# Patient Record
Sex: MALE | Race: WHITE | NOT HISPANIC OR LATINO | Employment: UNEMPLOYED | URBAN - NONMETROPOLITAN AREA
[De-identification: names, ages, dates, MRNs, and addresses within clinical notes are randomized per-mention and may not be internally consistent; named-entity substitution may affect disease eponyms.]

---

## 2019-09-20 ENCOUNTER — OFFICE VISIT (OUTPATIENT)
Dept: FAMILY MEDICINE CLINIC | Facility: CLINIC | Age: 24
End: 2019-09-20

## 2019-09-20 DIAGNOSIS — F90.2 ATTENTION DEFICIT HYPERACTIVITY DISORDER, COMBINED TYPE: Primary | ICD-10-CM

## 2019-09-20 PROCEDURE — 90791 PSYCH DIAGNOSTIC EVALUATION: CPT | Performed by: PSYCHOLOGIST

## 2019-10-04 NOTE — PROGRESS NOTES
10/4/2019    Ricki Andrade, a 24 y.o. male, was seen today for initial appointment lasting 45 minutes.  Patient is referred by his mother for counseling.     SUBJECTIVE:  He is experiencing th following symptoms: is easily bored, anhedonia, worry, amotivation, emotional eating, and sadness.    FAMILY HISTORY:  He lives in the home of his mother.  His parents  in 2010.  His mother plans to  a man form [place].  He will be moving with her in January 2020.      He has an unstable employment history.   He has no long term goals.  He is unemployed.    He enjoys music, movies and food.      MENTAL STATUS:  He was alert and oriented to time, place and person.      DIAGNOSIS:    ICD-10-CM ICD-9-CM   1. Attention deficit hyperactivity disorder, combined type F90.2 314.01       ASSESSMENT PLAN:  He will follow up as needed for counseling.                This document has been electronically signed by Abhinav Nieves, PhD on October 4, 2019 11:37 AM

## 2019-11-26 ENCOUNTER — OFFICE VISIT (OUTPATIENT)
Dept: FAMILY MEDICINE CLINIC | Facility: CLINIC | Age: 24
End: 2019-11-26

## 2019-11-26 DIAGNOSIS — F33.1 MAJOR DEPRESSIVE DISORDER, RECURRENT EPISODE, MODERATE (HCC): Primary | ICD-10-CM

## 2019-11-26 PROCEDURE — 90834 PSYTX W PT 45 MINUTES: CPT | Performed by: PSYCHOLOGIST

## 2019-11-26 NOTE — PROGRESS NOTES
PROGRESS NOTE  Data:  Ricki Andrade was seen for their regularly scheduled individual psychotherapy session.    (Scales based on 0 - 10 with 10 being the worst)  Depression: 6 Anxiety: 4   Distress: 2 Sleep: 4   Tasks Completed on Time: 2 Mood: 5   Number of Panic Attacks: 3 Appetite: 3       Mental Status Exam  Appearance:  clean and casually dressed, appropriate  Attitude toward clinician:  cooperative and agreeable   Speech:    Rate:  regular rate and rhythm   Volume:  normal  Motor:  no abnormal movements present  Mood:  sad  Affect:  mood congruent  Thought Processes:  linear, logical, and goal directed  Thought Content:  normal  Suicidal Thoughts:  absent  Homicidal Thoughts:  absent  Perceptual Disturbance: no perceptual disturbance  Attention and Concentration:  good  Insight and Judgement:  good  Memory:  memory appears to be intact    Patient's Support Network Includes:  He lives in the home with his mother.  Assessment/Plan   Clinical Maneuvering/Intervention:  Therapist & client discussed: (1) disappointment that he is not [facility] with his mother, (2) the symptoms of depression, (3) the ways he can manage depression, and (4) his anti-Episcopalian bent.      Diagnoses and all orders for this visit:    Major depressive disorder, recurrent episode, moderate (CMS/HCC)    I spent 45minutes in direct face to face contact with patient.  Greater than 50% of this time was spent counseling patient and discussing plan of care.  No Follow-up on file.

## 2020-01-24 ENCOUNTER — OFFICE VISIT (OUTPATIENT)
Dept: FAMILY MEDICINE CLINIC | Facility: CLINIC | Age: 25
End: 2020-01-24

## 2020-01-24 VITALS
HEART RATE: 120 BPM | OXYGEN SATURATION: 98 % | DIASTOLIC BLOOD PRESSURE: 82 MMHG | HEIGHT: 68 IN | SYSTOLIC BLOOD PRESSURE: 118 MMHG | BODY MASS INDEX: 31.07 KG/M2 | WEIGHT: 205 LBS

## 2020-01-24 DIAGNOSIS — F33.1 MAJOR DEPRESSIVE DISORDER, RECURRENT EPISODE, MODERATE (HCC): Primary | ICD-10-CM

## 2020-01-24 DIAGNOSIS — F32.9 MAJOR DEPRESSIVE DISORDER WITH CURRENT ACTIVE EPISODE, UNSPECIFIED DEPRESSION EPISODE SEVERITY, UNSPECIFIED WHETHER RECURRENT: Primary | ICD-10-CM

## 2020-01-24 DIAGNOSIS — Z76.89 ENCOUNTER TO ESTABLISH CARE: ICD-10-CM

## 2020-01-24 PROCEDURE — 90834 PSYTX W PT 45 MINUTES: CPT | Performed by: PSYCHOLOGIST

## 2020-01-24 PROCEDURE — 99203 OFFICE O/P NEW LOW 30 MIN: CPT | Performed by: STUDENT IN AN ORGANIZED HEALTH CARE EDUCATION/TRAINING PROGRAM

## 2020-01-24 RX ORDER — VENLAFAXINE HYDROCHLORIDE 75 MG/1
75 CAPSULE, EXTENDED RELEASE ORAL DAILY
Qty: 30 CAPSULE | Refills: 0 | Status: SHIPPED | OUTPATIENT
Start: 2020-01-24 | End: 2020-02-21 | Stop reason: SDUPTHER

## 2020-01-24 NOTE — PROGRESS NOTES
"    Family Medicine Residency  Riley Lora MD    Subjective:     Ricki Andrade is a 24 y.o. male who presents for management of     LACK OF MOTIVATION  -\"I have zero motivation\"  -patient constantly worries that he will be nothing, \"will never amount to anything\"  -denies manic episodes   -Patient reports that he will buy frivolous things on [employer], watch pornography and drink alcohol    WEIGHT ISSUES   - weight gain  -patient reports that he over eats   -Patient reports that he sometimes does nothing but sit on the couch and watch TV and eat bags of chips    Establish care with new physician  Patient is here to establish care with new physician, endorses that he has not had a PCP ever  -Patient reports that he has no medical history   -Patient has no other medical concerns except the ones previously mentioned    Patient denied chest pain, chest pressure, SOA, fever, chills, n/v or diarrhea at this time.     The following portions of the patient's history were reviewed and updated as appropriate: allergies, current medications, past family history, past medical history, past social history, past surgical history and problem list.    Past Medical Hx:  History reviewed. No pertinent past medical history.    Past Surgical Hx:  History reviewed. No pertinent surgical history.    Current Meds:    Current Outpatient Medications:   •  venlafaxine XR (EFFEXOR XR) 75 MG 24 hr capsule, Take 1 capsule by mouth Daily for 30 days., Disp: 30 capsule, Rfl: 0    Allergies:  Allergies   Allergen Reactions   • Cephalexin Unknown - High Severity       Family Hx:  History reviewed. No pertinent family history.     Social History:  Social History     Socioeconomic History   • Marital status: Single     Spouse name: Not on file   • Number of children: Not on file   • Years of education: Not on file   • Highest education level: Not on file   Tobacco Use   • Smoking status: Never Smoker   • Smokeless tobacco: Never Used   " "  Substance and Sexual Activity   • Alcohol use: Yes     Alcohol/week: 4.0 standard drinks     Types: 2 Cans of beer, 1 Shots of liquor, 1 Glasses of wine per week   • Drug use: Never       Review of Systems  Review of Systems   Constitutional: Negative for activity change, appetite change, chills, fatigue and fever.   HENT: Negative for congestion, hearing loss, sinus pressure, sinus pain, sneezing, sore throat and trouble swallowing.    Eyes: Negative for pain, discharge and itching.   Respiratory: Negative for apnea, cough, choking, chest tightness, shortness of breath, wheezing and stridor.    Cardiovascular: Negative for chest pain, palpitations and leg swelling.   Gastrointestinal: Negative for abdominal distention, abdominal pain, anal bleeding, constipation, diarrhea, nausea and vomiting.   Genitourinary: Negative for difficulty urinating, dysuria, enuresis and flank pain.   Musculoskeletal: Negative for arthralgias, back pain and gait problem.   Skin: Negative for color change.   Neurological: Negative for dizziness, seizures, syncope, facial asymmetry, light-headedness, numbness and headaches.   Psychiatric/Behavioral: Positive for dysphoric mood. Negative for agitation, behavioral problems, self-injury and suicidal ideas. The patient is not nervous/anxious.        Objective:     /82   Pulse 120   Ht 172.7 cm (68\")   Wt 93 kg (205 lb)   SpO2 98%   BMI 31.17 kg/m²   Physical Exam   Constitutional: He is oriented to person, place, and time. He appears well-developed and well-nourished. No distress.   HENT:   Head: Normocephalic and atraumatic.   Right Ear: External ear normal.   Left Ear: External ear normal.   Nose: Nose normal.   Mouth/Throat: Oropharynx is clear and moist.   Eyes: Pupils are equal, round, and reactive to light. EOM are normal. Right eye exhibits no discharge. Left eye exhibits no discharge.   Neck: Normal range of motion. Neck supple. No tracheal deviation present. No " thyromegaly present.   Cardiovascular: Normal rate, regular rhythm, normal heart sounds and intact distal pulses.   Pulmonary/Chest: Effort normal and breath sounds normal. No stridor. No respiratory distress. He has no wheezes. He has no rales.   Abdominal: Soft. Bowel sounds are normal. He exhibits no distension. There is no tenderness.   Musculoskeletal: Normal range of motion. He exhibits no edema, tenderness or deformity.   Lymphadenopathy:     He has no cervical adenopathy.   Neurological: He is alert and oriented to person, place, and time. No cranial nerve deficit.   Skin: Skin is warm and dry. He is not diaphoretic.   Psychiatric: He has a normal mood and affect.      TOTAL PHQ 9 SCORE 14    Assessment/Plan:     Ricki was seen today for weight management.    Diagnoses and all orders for this visit:    Major depressive disorder with current active episode, unspecified depression episode severity, unspecified whether recurrent  -     venlafaxine XR (EFFEXOR XR) 75 MG 24 hr capsule; Take 1 capsule by mouth Daily for 30 days.    Encounter to establish care    Will trial Effexor for 1 month for depression  -Advised continued therapy sessions with Lizbeth    Of note, consider Wellbutrin for depression and weight control however until patient endorses that he does not longer drink alcohol  - it is not an option at this time    · Rx changes: refer to A/P  · Patient Education:  Medication compliance and advised lifestyle and dietary modifications  · Compliance at present is estimated to be excellent.     Reviewed labs, patient voiced understanding of results.   I reviewed this patients previous chart in order to optimize patient care.   Discussed risks and benefits of continued treatment. Patient voiced understanding.    Follow-up:     Return in about 4 weeks (around 2/21/2020).    Goals: Alleviate depression  Barriers: None    Preventative:  Health Maintenance   Topic Date Due   • ANNUAL PHYSICAL  05/29/1999   •  TDAP/TD VACCINES (1 - Tdap) 05/29/2007   • INFLUENZA VACCINE  08/01/2019       Male Preventative: Patient does Testicular self exam      Social History     Tobacco Use   • Smoking status: Never Smoker   • Smokeless tobacco: Never Used   Substance Use Topics   • Alcohol use: Yes     Alcohol/week: 4.0 standard drinks     Types: 2 Cans of beer, 1 Shots of liquor, 1 Glasses of wine per week       Patient's Body mass index is 31.17 kg/m². BMI is above normal parameters. Recommendations include: exercise counseling and nutrition counseling.   eat more fruits and vegetables, decrease soda or juice intake, increase water intake and reduce fast food intake    RISK SCORE: 3    Signed,   Riley Lora MD  Family Medicine Resident PGY2  [facility]        This document has been electronically signed by Riley Lora MD on January 24, 2020 3:42 PM    Part of this note may be an electronic transcription/translation of spoken language to printed text using the Dragon Dictation System

## 2020-01-25 NOTE — PROGRESS NOTES
PROGRESS NOTE  Data:  Ricki Andrade was seen for their regularly scheduled individual psychotherapy session.    (Scales based on 0 - 10 with 10 being the worst)  Depression: 3 Anxiety: 3   Distress: 3 Sleep: 4   Tasks Completed on Time: 3 Mood: 3   Number of Panic Attacks: 2 Appetite: 2       Mental Status Exam  Appearance:  clean and casually dressed, appropriate  Attitude toward clinician:  cooperative and agreeable   Speech:    Rate:  regular rate and rhythm   Volume:  normal  Motor:  no abnormal movements present  Mood:  sad  Affect:  dysphoric  Thought Processes:  linear, logical, and goal directed  Thought Content:  normal  Suicidal Thoughts:  absent  Homicidal Thoughts:  absent  Perceptual Disturbance: no perceptual disturbance  Attention and Concentration:  good  Insight and Judgement:  good  Memory:  memory appears to be intact    Patient's Support Network Includes:  HE lives in the home with his mother and stepfather.  Assessment/Plan   Clinical Maneuvering/Intervention:  Therapist & client discussed: (1) the use of CBT skills, (2) his amotivation, (3) his desire to get a job, (4) the conflict with his friends, (5) his lack of friends at this time, (6) his desire not to leave the home.    Diagnoses and all orders for this visit:    Major depressive disorder, recurrent episode, moderate (CMS/HCC)      No follow-ups on file.    I spent 45 minutes in direct face to face contact with patient.  Greater than 50% of this time was spent counseling patient and discussing plan of care.

## 2020-01-27 NOTE — PROGRESS NOTES
I have seen the patient.  I have reviewed the notes, assessments, and/or procedures performed by dr Lora, I concur with her/his documentation and assessment and plan for Ricki Andrade.               This document has been electronically signed by Neftali Mcgovern MD on January 27, 2020 10:27 AM

## 2020-02-21 DIAGNOSIS — F32.9 MAJOR DEPRESSIVE DISORDER WITH CURRENT ACTIVE EPISODE, UNSPECIFIED DEPRESSION EPISODE SEVERITY, UNSPECIFIED WHETHER RECURRENT: ICD-10-CM

## 2020-02-21 RX ORDER — VENLAFAXINE HYDROCHLORIDE 75 MG/1
75 CAPSULE, EXTENDED RELEASE ORAL DAILY
Qty: 30 CAPSULE | Refills: 0 | Status: SHIPPED | OUTPATIENT
Start: 2020-02-21 | End: 2020-03-13 | Stop reason: SDUPTHER

## 2020-03-13 ENCOUNTER — OFFICE VISIT (OUTPATIENT)
Dept: FAMILY MEDICINE CLINIC | Facility: CLINIC | Age: 25
End: 2020-03-13

## 2020-03-13 VITALS
HEIGHT: 68 IN | HEART RATE: 103 BPM | BODY MASS INDEX: 30.01 KG/M2 | SYSTOLIC BLOOD PRESSURE: 122 MMHG | WEIGHT: 198 LBS | DIASTOLIC BLOOD PRESSURE: 80 MMHG | OXYGEN SATURATION: 98 %

## 2020-03-13 DIAGNOSIS — F32.9 MAJOR DEPRESSIVE DISORDER WITH CURRENT ACTIVE EPISODE, UNSPECIFIED DEPRESSION EPISODE SEVERITY, UNSPECIFIED WHETHER RECURRENT: ICD-10-CM

## 2020-03-13 DIAGNOSIS — F41.1 GENERALIZED ANXIETY DISORDER: Primary | ICD-10-CM

## 2020-03-13 PROCEDURE — 99213 OFFICE O/P EST LOW 20 MIN: CPT | Performed by: STUDENT IN AN ORGANIZED HEALTH CARE EDUCATION/TRAINING PROGRAM

## 2020-03-13 PROCEDURE — 90834 PSYTX W PT 45 MINUTES: CPT | Performed by: PSYCHOLOGIST

## 2020-03-13 RX ORDER — VENLAFAXINE HYDROCHLORIDE 150 MG/1
150 CAPSULE, EXTENDED RELEASE ORAL DAILY
Qty: 30 CAPSULE | Refills: 2 | Status: SHIPPED | OUTPATIENT
Start: 2020-03-13 | End: 2020-06-12

## 2020-03-13 NOTE — PROGRESS NOTES
I have seen the patient.  I have reviewed the notes, assessments, and/or procedures performed by dr Thomas, I concur with her/his documentation and assessment and plan for Ricki Andrade.               This document has been electronically signed by Neftali Mcgovern MD on March 13, 2020 10:56

## 2020-03-13 NOTE — PROGRESS NOTES
"  Subjective:     Ricki Andrade is a 24 y.o. male who presents for follow up on SANDY/MDD.    He is currently taking effexor 75 mg daily. He reports \"okay\" control of his symptoms on this medication. He denies SI/HI. He reports mild anhedonia.  He also reports mild fatigue and occasional difficulty with sleeping.  Overall symptoms appear to be better on the Effexor but he would still like more improvement.  He is also attending therapy which he thinks has helped.  Weight is better after starting intermittent fasting diet.     Past Medical Hx:  History reviewed. No pertinent past medical history.    Past Surgical Hx:  History reviewed. No pertinent surgical history.    Health Maintenance:  Health Maintenance   Topic Date Due   • ANNUAL PHYSICAL  05/29/1999   • TDAP/TD VACCINES (1 - Tdap) 05/29/2007   • INFLUENZA VACCINE  08/01/2019       Current Meds:    Current Outpatient Medications:   •  venlafaxine XR (EFFEXOR-XR) 150 MG 24 hr capsule, Take 1 capsule by mouth Daily for 30 days., Disp: 30 capsule, Rfl: 2    Allergies:  Cephalexin    Family Hx:  History reviewed. No pertinent family history.     Social History:  Social History     Socioeconomic History   • Marital status: Single     Spouse name: Not on file   • Number of children: Not on file   • Years of education: Not on file   • Highest education level: Not on file   Tobacco Use   • Smoking status: Never Smoker   • Smokeless tobacco: Never Used   Substance and Sexual Activity   • Alcohol use: Yes     Alcohol/week: 4.0 standard drinks     Types: 2 Cans of beer, 1 Shots of liquor, 1 Glasses of wine per week   • Drug use: Never       Review of Systems   Constitutional: Negative for activity change, appetite change, chills, fatigue and fever.   HENT: Negative for hearing loss, sneezing, sore throat and trouble swallowing.    Eyes: Negative for visual disturbance.   Respiratory: Negative for cough, chest tightness and shortness of breath.    " "  Cardiovascular: Negative for chest pain, palpitations and leg swelling.   Gastrointestinal: Negative for abdominal pain, blood in stool, constipation, diarrhea, nausea and vomiting.   Genitourinary: Negative for difficulty urinating, dysuria and frequency.   Musculoskeletal: Negative for arthralgias and back pain.   Skin: Negative for rash.   Allergic/Immunologic: Negative for environmental allergies and food allergies.   Neurological: Negative for dizziness, light-headedness and headaches.   Psychiatric/Behavioral: Positive for sleep disturbance. Negative for agitation, confusion, hallucinations and suicidal ideas.         Objective:     /80   Pulse 103   Ht 172.7 cm (68\")   Wt 89.8 kg (198 lb)   SpO2 98%   BMI 30.11 kg/m²     Physical Exam   Constitutional: He is oriented to person, place, and time. He appears well-developed and well-nourished. No distress.   HENT:   Head: Normocephalic and atraumatic.   Right Ear: External ear normal.   Left Ear: External ear normal.   Neck: Normal range of motion. Neck supple.   Cardiovascular: Normal rate, regular rhythm and normal heart sounds.   Pulmonary/Chest: Effort normal and breath sounds normal. No respiratory distress.   Abdominal: Soft. Bowel sounds are normal. He exhibits no distension and no mass. There is no tenderness.   Musculoskeletal: Normal range of motion. He exhibits no edema.   Neurological: He is alert and oriented to person, place, and time. He has normal reflexes.   Skin: Skin is warm and dry.   Psychiatric: He has a normal mood and affect. His behavior is normal.         Assessment/Plan:     Ricki was seen today for depression.    Diagnoses and all orders for this visit:    Major depressive disorder with current active episode, unspecified depression episode severity, unspecified whether recurrent  -     venlafaxine XR (EFFEXOR-XR) 150 MG 24 hr capsule; Take 1 capsule by mouth Daily for 30 days.      1.  We will increase Effexor from 75 " mg daily to 150 mg daily.  Encouraged him to continue with his therapy sessions.  Also encouraged him to continue with his intermittent fasting diet as I believe this is helping.  We will see him back in 6 weeks for a follow-up on depression after changing dosage of medication as well as an annual exam.    Follow-up:     Return in about 6 weeks (around 4/24/2020) for Recheck MDD, Annual.      Health Maintenance   Topic Date Due   • ANNUAL PHYSICAL  05/29/1999   • TDAP/TD VACCINES (1 - Tdap) 05/29/2007   • INFLUENZA VACCINE  08/01/2019         Preventative:      There is no immunization history on file for this patient.  Social History     Tobacco Use   Smoking Status Never Smoker   Smokeless Tobacco Never Used     Social History     Substance and Sexual Activity   Alcohol Use Yes   • Alcohol/week: 4.0 standard drinks   • Types: 2 Cans of beer, 1 Shots of liquor, 1 Glasses of wine per week     Patient's Body mass index is 30.11 kg/m². BMI is above normal parameters. Recommendations include: exercise counseling and nutrition counseling.     On osteoporosis therapy?Not Indicated     Goals     • Other      Improvement in depressive symptoms  Barrier: None              RISK SCORE: 3        Veronica Thomas MD PGY3  [facility] Family Medicine Residency  This document has been electronically signed by Veronica Thomas MD on March 13, 2020 10:25

## 2020-03-16 NOTE — PROGRESS NOTES
PROGRESS NOTE  Data:  Ricki Andrade was seen for their regularly scheduled individual psychotherapy session.    (Scales based on 0 - 10 with 10 being the worst)  Depression: 4 Anxiety: 4   Distress: 2 Sleep: 1   Tasks Completed on Time: 3 Mood: 3   Number of Panic Attacks: 2 Appetite: 0       Mental Status Exam  Appearance:  clean and casually dressed, appropriate  Attitude toward clinician:  cooperative and agreeable   Speech:    Rate:  regular rate and rhythm   Volume:  normal  Motor:  no abnormal movements present  Mood:  WNL  Affect:  anxious  Thought Processes:  linear, logical, and goal directed  Thought Content:  normal  Suicidal Thoughts:  absent  Homicidal Thoughts:  absent  Perceptual Disturbance: no perceptual disturbance  Attention and Concentration:  good  Insight and Judgement:  good  Memory:  memory appears to be intact    Patient's Support Network Includes:  HE lives in the home with his mother.  Assessment/Plan   Clinical Maneuvering/Intervention:  Therapist & client discussed: (1) his relative lack of anxiety related to the coronoa virus, (2) his new medication which has caused some improvement in his mental health, (3) his long term goals, and (4) the ways he can increase coping skills.    His desire is to get a job, get a girlfriend and get an apartment.  Diagnoses and all orders for this visit:    Generalized anxiety disorder      No follow-ups on file.        I spent 45minutes in direct face to face contact with patient.  Greater than 50% of this time was spent counseling patient and discussing plan of care.

## 2020-06-12 DIAGNOSIS — F32.9 MAJOR DEPRESSIVE DISORDER WITH CURRENT ACTIVE EPISODE, UNSPECIFIED DEPRESSION EPISODE SEVERITY, UNSPECIFIED WHETHER RECURRENT: ICD-10-CM

## 2020-06-12 RX ORDER — VENLAFAXINE HYDROCHLORIDE 150 MG/1
CAPSULE, EXTENDED RELEASE ORAL
Qty: 30 CAPSULE | Refills: 2 | Status: SHIPPED | OUTPATIENT
Start: 2020-06-12 | End: 2020-09-07

## 2020-08-31 DIAGNOSIS — J30.2 SEASONAL ALLERGIES: Primary | ICD-10-CM

## 2020-08-31 RX ORDER — FEXOFENADINE HCL 180 MG/1
180 TABLET ORAL DAILY
Qty: 30 TABLET | Refills: 3 | Status: SHIPPED | OUTPATIENT
Start: 2020-08-31 | End: 2021-03-31 | Stop reason: SDUPTHER

## 2020-09-03 DIAGNOSIS — F32.9 MAJOR DEPRESSIVE DISORDER WITH CURRENT ACTIVE EPISODE, UNSPECIFIED DEPRESSION EPISODE SEVERITY, UNSPECIFIED WHETHER RECURRENT: ICD-10-CM

## 2020-09-07 RX ORDER — VENLAFAXINE HYDROCHLORIDE 150 MG/1
CAPSULE, EXTENDED RELEASE ORAL
Qty: 30 CAPSULE | Refills: 2 | Status: SHIPPED | OUTPATIENT
Start: 2020-09-07 | End: 2020-11-20

## 2020-11-19 DIAGNOSIS — F32.9 MAJOR DEPRESSIVE DISORDER WITH CURRENT ACTIVE EPISODE, UNSPECIFIED DEPRESSION EPISODE SEVERITY, UNSPECIFIED WHETHER RECURRENT: ICD-10-CM

## 2020-11-20 RX ORDER — VENLAFAXINE HYDROCHLORIDE 150 MG/1
CAPSULE, EXTENDED RELEASE ORAL
Qty: 30 CAPSULE | Refills: 2 | Status: SHIPPED | OUTPATIENT
Start: 2020-11-20 | End: 2021-03-31

## 2021-02-21 DIAGNOSIS — F32.9 MAJOR DEPRESSIVE DISORDER WITH CURRENT ACTIVE EPISODE, UNSPECIFIED DEPRESSION EPISODE SEVERITY, UNSPECIFIED WHETHER RECURRENT: ICD-10-CM

## 2021-02-22 ENCOUNTER — TELEPHONE (OUTPATIENT)
Dept: FAMILY MEDICINE CLINIC | Facility: CLINIC | Age: 26
End: 2021-02-22

## 2021-02-22 RX ORDER — VENLAFAXINE HYDROCHLORIDE 150 MG/1
CAPSULE, EXTENDED RELEASE ORAL
Qty: 30 CAPSULE | Refills: 2 | OUTPATIENT
Start: 2021-02-22

## 2021-02-22 NOTE — TELEPHONE ENCOUNTER
Called patient to schedule overdue f/u    Patient advised he will call back at later date to schedule an apt.     [phone]

## 2021-03-25 ENCOUNTER — TELEPHONE (OUTPATIENT)
Dept: FAMILY MEDICINE CLINIC | Facility: CLINIC | Age: 26
End: 2021-03-25

## 2021-03-25 NOTE — TELEPHONE ENCOUNTER
PATIENT CALLED ASKING FOR MEDICATION REFILLS. PATIENT HAS NOT BEEN SEEN IN OVER A YEAR, HOWEVER REQUESTED A VIDEO VISIT FROM PCP. PATIENT HAS NOT HAD A PCP SINCE DR. WEINER LEFT BUT HAS SEEN DR. HALE BEFORE. WOULD HE BE WILLING TO DO A VIDEO VISIT FOR OVER DUE FOLLOW UP AND MEDICATION REFILLS?     THANKS,

## 2021-03-25 NOTE — TELEPHONE ENCOUNTER
PATIENT CALLED ASKING FOR MEDICATION REFILLS. PATIENT HAS NOT BEEN SEEN IN OVER A YEAR, HOWEVER REQUESTED A VIDEO VISIT FROM PCP. PATIENT HAS NOT HAD A PCP SINCE DR. WEINER LEFT BUT HAS SEEN DR. HALE BEFORE. WOULD HE BE WILLING TO DO A VIDEO VISIT FOR OVER DUE FOLLOW UP AND MEDICATION REFILLS?    THANKS,  AYLEEN

## 2021-03-25 NOTE — TELEPHONE ENCOUNTER
PATIENT CALLED ASKING FOR MEDICATION REFILLS. PATIENT HAS NOT BEEN SEEN IN OVER A YEAR, HOWEVER REQUESTED A VIDEO VISIT FROM PCP. PATIENT HAS NOT HAD A PCP SINCE DR. WEINER LEFT BUT HAS SEEN DR. HALE BEFORE. WOULD HE BE WILLING TO DO A VIDEO VISIT FOR OVER DUE FOLLOW UP AND MEDICATION REFILLS?

## 2021-03-26 NOTE — TELEPHONE ENCOUNTER
CALLED PATIENT TO LET HIM KNOW THAT DR. HALE WOULD LIKE TO SEE PATIENT IN OFFICE TO DO A CHECK UP.  UNABLE TO REACH PATIENT, LEFT VM FOR PATIENT TO RETURN CALL.    THANKS,  AYLEEN

## 2021-03-31 ENCOUNTER — OFFICE VISIT (OUTPATIENT)
Dept: FAMILY MEDICINE CLINIC | Facility: CLINIC | Age: 26
End: 2021-03-31

## 2021-03-31 VITALS
HEIGHT: 69 IN | OXYGEN SATURATION: 99 % | DIASTOLIC BLOOD PRESSURE: 78 MMHG | BODY MASS INDEX: 32.51 KG/M2 | WEIGHT: 219.5 LBS | SYSTOLIC BLOOD PRESSURE: 122 MMHG | HEART RATE: 105 BPM

## 2021-03-31 DIAGNOSIS — J30.2 SEASONAL ALLERGIES: ICD-10-CM

## 2021-03-31 DIAGNOSIS — F32.9 MAJOR DEPRESSIVE DISORDER WITH CURRENT ACTIVE EPISODE, UNSPECIFIED DEPRESSION EPISODE SEVERITY, UNSPECIFIED WHETHER RECURRENT: Primary | ICD-10-CM

## 2021-03-31 PROCEDURE — 99213 OFFICE O/P EST LOW 20 MIN: CPT | Performed by: STUDENT IN AN ORGANIZED HEALTH CARE EDUCATION/TRAINING PROGRAM

## 2021-03-31 RX ORDER — FEXOFENADINE HCL 180 MG/1
180 TABLET ORAL DAILY
Qty: 30 TABLET | Refills: 3 | Status: SHIPPED | OUTPATIENT
Start: 2021-03-31 | End: 2022-01-06 | Stop reason: SDUPTHER

## 2021-03-31 RX ORDER — VENLAFAXINE HYDROCHLORIDE 75 MG/1
75 CAPSULE, EXTENDED RELEASE ORAL DAILY
Qty: 6 CAPSULE | Refills: 0 | Status: SHIPPED | OUTPATIENT
Start: 2021-03-31 | End: 2021-06-01

## 2021-03-31 RX ORDER — VENLAFAXINE HYDROCHLORIDE 150 MG/1
150 CAPSULE, EXTENDED RELEASE ORAL DAILY
Qty: 30 CAPSULE | Refills: 2 | Status: CANCELLED | OUTPATIENT
Start: 2021-03-31

## 2021-03-31 RX ORDER — VENLAFAXINE HYDROCHLORIDE 37.5 MG/1
37.5 CAPSULE, EXTENDED RELEASE ORAL DAILY
Qty: 6 CAPSULE | Refills: 0 | Status: SHIPPED | OUTPATIENT
Start: 2021-04-07 | End: 2021-06-01

## 2021-03-31 RX ORDER — DULOXETIN HYDROCHLORIDE 60 MG/1
60 CAPSULE, DELAYED RELEASE ORAL DAILY
Qty: 30 CAPSULE | Refills: 2 | Status: SHIPPED | OUTPATIENT
Start: 2021-04-14 | End: 2021-06-01 | Stop reason: SDUPTHER

## 2021-03-31 NOTE — PROGRESS NOTES
Subjective   Ricki Andrade is a 25 y.o. male who presents for follow up for depression. Currently maintained on Effexor 150g. Does not believe he is getting adequate response. Would like a change in medication.       Past Medical Hx:  History reviewed. No pertinent past medical history.    Past Surgical Hx:  History reviewed. No pertinent surgical history.    Health Maintenance:  Health Maintenance   Topic Date Due   • ANNUAL PHYSICAL  Never done   • TDAP/TD VACCINES (1 - Tdap) Never done   • HEPATITIS C SCREENING  Never done   • INFLUENZA VACCINE  Never done   • Pneumococcal Vaccine 0-64  Aged Out   • MENINGOCOCCAL VACCINE  Aged Out       Current Meds:    Current Outpatient Medications:   •  fexofenadine (ALLEGRA) 180 MG tablet, Take 1 tablet by mouth Daily., Disp: 30 tablet, Rfl: 3  •  [START ON 4/14/2021] DULoxetine (Cymbalta) 60 MG capsule, Take 1 capsule by mouth Daily., Disp: 30 capsule, Rfl: 2  •  [START ON 4/7/2021] venlafaxine XR (Effexor XR) 37.5 MG 24 hr capsule, Take 1 capsule by mouth Daily for 6 days., Disp: 6 capsule, Rfl: 0  •  venlafaxine XR (Effexor XR) 75 MG 24 hr capsule, Take 1 capsule by mouth Daily for 6 days., Disp: 6 capsule, Rfl: 0    Allergies:  Cephalexin    Family Hx:  History reviewed. No pertinent family history.     Social History:  Social History     Socioeconomic History   • Marital status: Single     Spouse name: Not on file   • Number of children: Not on file   • Years of education: Not on file   • Highest education level: Not on file   Tobacco Use   • Smoking status: Never Smoker   • Smokeless tobacco: Never Used   Substance and Sexual Activity   • Alcohol use: Yes     Alcohol/week: 4.0 standard drinks     Types: 2 Cans of beer, 1 Shots of liquor, 1 Glasses of wine per week   • Drug use: Never       Review of Systems  Review of Systems   Constitutional: Negative for appetite change, chills, diaphoresis, fatigue and fever.   HENT: Negative for  "ear discharge, ear pain, facial swelling, hearing loss, rhinorrhea, sinus pressure, sinus pain, sneezing, sore throat and voice change.    Eyes: Negative for pain, discharge and visual disturbance.   Respiratory: Negative for apnea, cough, chest tightness, shortness of breath, wheezing and stridor.    Cardiovascular: Negative for chest pain, palpitations and leg swelling.   Gastrointestinal: Negative for abdominal distention, abdominal pain, constipation, diarrhea, rectal pain and vomiting.   Genitourinary: Negative for dysuria, frequency and urgency.   Musculoskeletal: Negative for arthralgias, back pain, myalgias and neck pain.   Skin: Negative for color change, rash and wound.   Neurological: Negative for facial asymmetry, speech difficulty, light-headedness and headaches.   Psychiatric/Behavioral: Positive for dysphoric mood. Negative for agitation and decreased concentration. The patient is not nervous/anxious.             Objective:     /78   Pulse 105   Ht 175.3 cm (69\")   Wt 99.6 kg (219 lb 8 oz)   SpO2 99%   BMI 32.41 kg/m²       Physical Exam  Constitutional:       General: He is not in acute distress.     Appearance: He is well-developed. He is not diaphoretic.   HENT:      Head: Normocephalic and atraumatic.      Right Ear: External ear normal.      Left Ear: External ear normal.   Eyes:      General: No scleral icterus.     Conjunctiva/sclera: Conjunctivae normal.      Pupils: Pupils are equal, round, and reactive to light.   Cardiovascular:      Rate and Rhythm: Normal rate and regular rhythm.      Heart sounds: Normal heart sounds. No murmur heard.   No friction rub. No gallop.    Pulmonary:      Effort: Pulmonary effort is normal. No respiratory distress.      Breath sounds: Normal breath sounds. No stridor. No wheezing or rales.   Chest:      Chest wall: No tenderness.   Abdominal:      General: Bowel sounds are normal. There is no distension.      Palpations: Abdomen is soft.      " Tenderness: There is no abdominal tenderness.   Musculoskeletal:         General: No tenderness. Normal range of motion.      Cervical back: Normal range of motion.   Skin:     General: Skin is warm and dry.      Findings: No erythema.   Neurological:      Mental Status: He is alert and oriented to person, place, and time.   Psychiatric:         Mood and Affect: Mood is depressed.         Behavior: Behavior normal.         Assessment/Plan:     Diagnoses and all orders for this visit:    1. Major depressive disorder with current active episode, unspecified depression episode severity, unspecified whether recurrent (Primary)  -     venlafaxine XR (Effexor XR) 75 MG 24 hr capsule; Take 1 capsule by mouth Daily for 6 days.  Dispense: 6 capsule; Refill: 0  -     venlafaxine XR (Effexor XR) 37.5 MG 24 hr capsule; Take 1 capsule by mouth Daily for 6 days.  Dispense: 6 capsule; Refill: 0  -     DULoxetine (Cymbalta) 60 MG capsule; Take 1 capsule by mouth Daily.  Dispense: 30 capsule; Refill: 2    2. Seasonal allergies  -     fexofenadine (ALLEGRA) 180 MG tablet; Take 1 tablet by mouth Daily.  Dispense: 30 tablet; Refill: 3    Other orders  -     Cancel: venlafaxine XR (EFFEXOR-XR) 150 MG 24 hr capsule; Take 1 capsule by mouth Daily.  Dispense: 30 capsule; Refill: 2     Will wean Effexor over the next 2 weeks. Will start Cymbalta at that time due to less than ideal response to Effexor and Pt stating he would like a weight neutral medication. Will follow up in 6 weeks for treatment response.     Follow-up:     Return in about 6 weeks (around 5/12/2021).    Goals     • Other      Improvement in depressive symptoms  Barrier: None            Preventative:    Vaccines Recommended at this visit:   No Vaccines recommended today. Patient is up to date on all vaccines.     Vaccines Received at this visit:  No Vaccines recommended today. Patient is up to date on all vaccines.     Screenings Recommended at this visit:  No Screenings  offered today. Patient is up to date on all screenings at this time.     Screenings Ordered at this visit:  No screenings were offered today. Patient is up to date on all screenings.     Smoking Status:  Patient has never smoked.    Alcohol Intake:  Occasional/rare    Patient's Body mass index is 32.41 kg/m². BMI is above normal parameters. Recommendations include: exercise counseling and nutrition counseling.         RISK SCORE: 3          This document has been electronically signed by Allison Babin MD on March 31, 2021 16:51 CDT

## 2021-04-03 DIAGNOSIS — F32.9 MAJOR DEPRESSIVE DISORDER WITH CURRENT ACTIVE EPISODE, UNSPECIFIED DEPRESSION EPISODE SEVERITY, UNSPECIFIED WHETHER RECURRENT: ICD-10-CM

## 2021-04-06 DIAGNOSIS — F32.9 MAJOR DEPRESSIVE DISORDER WITH CURRENT ACTIVE EPISODE, UNSPECIFIED DEPRESSION EPISODE SEVERITY, UNSPECIFIED WHETHER RECURRENT: ICD-10-CM

## 2021-04-07 RX ORDER — VENLAFAXINE HYDROCHLORIDE 75 MG/1
75 CAPSULE, EXTENDED RELEASE ORAL DAILY
Qty: 6 CAPSULE | Refills: 0 | OUTPATIENT
Start: 2021-04-07 | End: 2021-04-13

## 2021-04-07 RX ORDER — VENLAFAXINE HYDROCHLORIDE 37.5 MG/1
37.5 CAPSULE, EXTENDED RELEASE ORAL DAILY
Qty: 6 CAPSULE | Refills: 0 | OUTPATIENT
Start: 2021-04-07 | End: 2021-04-13

## 2021-04-08 RX ORDER — VENLAFAXINE HYDROCHLORIDE 37.5 MG/1
37.5 CAPSULE, EXTENDED RELEASE ORAL DAILY
Qty: 6 CAPSULE | Refills: 0 | OUTPATIENT
Start: 2021-04-08 | End: 2021-04-14

## 2021-04-08 RX ORDER — VENLAFAXINE HYDROCHLORIDE 75 MG/1
75 CAPSULE, EXTENDED RELEASE ORAL DAILY
Qty: 6 CAPSULE | Refills: 0 | OUTPATIENT
Start: 2021-04-08 | End: 2021-04-14

## 2021-06-01 ENCOUNTER — OFFICE VISIT (OUTPATIENT)
Dept: FAMILY MEDICINE CLINIC | Facility: CLINIC | Age: 26
End: 2021-06-01

## 2021-06-01 VITALS
HEART RATE: 105 BPM | TEMPERATURE: 97.3 F | WEIGHT: 221.2 LBS | BODY MASS INDEX: 32.76 KG/M2 | OXYGEN SATURATION: 98 % | DIASTOLIC BLOOD PRESSURE: 100 MMHG | SYSTOLIC BLOOD PRESSURE: 140 MMHG | HEIGHT: 69 IN

## 2021-06-01 DIAGNOSIS — F32.9 MAJOR DEPRESSIVE DISORDER WITH CURRENT ACTIVE EPISODE, UNSPECIFIED DEPRESSION EPISODE SEVERITY, UNSPECIFIED WHETHER RECURRENT: ICD-10-CM

## 2021-06-01 PROCEDURE — 99213 OFFICE O/P EST LOW 20 MIN: CPT | Performed by: STUDENT IN AN ORGANIZED HEALTH CARE EDUCATION/TRAINING PROGRAM

## 2021-06-01 RX ORDER — DULOXETIN HYDROCHLORIDE 60 MG/1
60 CAPSULE, DELAYED RELEASE ORAL DAILY
Qty: 30 CAPSULE | Refills: 2 | Status: SHIPPED | OUTPATIENT
Start: 2021-06-01 | End: 2021-10-07 | Stop reason: SDUPTHER

## 2021-06-01 NOTE — PROGRESS NOTES
Subjective   Ricki Andrade is a 26 y.o. male who presents for follow up for Depression and anxiety. Weaned off Effexor at previous visit and currently on Cymbalta. Endorses marked improvement in symptoms.       Past Medical Hx:  History reviewed. No pertinent past medical history.    Past Surgical Hx:  History reviewed. No pertinent surgical history.    Health Maintenance:  Health Maintenance   Topic Date Due   • ANNUAL PHYSICAL  Never done   • COVID-19 Vaccine (1) Never done   • TDAP/TD VACCINES (1 - Tdap) Never done   • HEPATITIS C SCREENING  Never done   • INFLUENZA VACCINE  08/01/2021   • Pneumococcal Vaccine 0-64  Aged Out       Current Meds:    Current Outpatient Medications:   •  DULoxetine (Cymbalta) 60 MG capsule, Take 1 capsule by mouth Daily., Disp: 30 capsule, Rfl: 2  •  fexofenadine (ALLEGRA) 180 MG tablet, Take 1 tablet by mouth Daily., Disp: 30 tablet, Rfl: 3    Allergies:  Cephalexin    Family Hx:  History reviewed. No pertinent family history.     Social History:  Social History     Socioeconomic History   • Marital status: Single     Spouse name: Not on file   • Number of children: Not on file   • Years of education: Not on file   • Highest education level: Not on file   Tobacco Use   • Smoking status: Never Smoker   • Smokeless tobacco: Never Used   Substance and Sexual Activity   • Alcohol use: Yes     Alcohol/week: 4.0 standard drinks     Types: 2 Cans of beer, 1 Shots of liquor, 1 Glasses of wine per week   • Drug use: Never       Review of Systems  Review of Systems   Constitutional: Negative for appetite change, chills, diaphoresis, fatigue and fever.   HENT: Negative for ear discharge, ear pain, facial swelling, hearing loss, rhinorrhea, sinus pressure, sinus pain, sneezing, sore throat and voice change.    Eyes: Negative for pain, discharge and visual disturbance.   Respiratory: Negative for apnea, cough, chest tightness, shortness of breath, wheezing and  "stridor.    Cardiovascular: Negative for chest pain, palpitations and leg swelling.   Gastrointestinal: Negative for abdominal distention, abdominal pain, constipation, diarrhea, nausea and vomiting.   Genitourinary: Negative for dysuria, frequency and urgency.   Musculoskeletal: Negative for arthralgias, back pain, myalgias and neck pain.   Skin: Negative for color change, rash and wound.   Neurological: Negative for facial asymmetry, speech difficulty, light-headedness and headaches.   Psychiatric/Behavioral: Negative for agitation and decreased concentration. The patient is not nervous/anxious.             Objective:     /100   Pulse 105   Temp 97.3 °F (36.3 °C)   Ht 175.3 cm (69\")   Wt 100 kg (221 lb 3.2 oz)   SpO2 98%   BMI 32.67 kg/m²       Physical Exam  Constitutional:       General: He is not in acute distress.     Appearance: He is well-developed. He is not diaphoretic.   HENT:      Head: Normocephalic and atraumatic.      Right Ear: External ear normal.      Left Ear: External ear normal.   Eyes:      General: No scleral icterus.     Conjunctiva/sclera: Conjunctivae normal.      Pupils: Pupils are equal, round, and reactive to light.   Cardiovascular:      Rate and Rhythm: Normal rate and regular rhythm.      Heart sounds: Normal heart sounds. No murmur heard.   No friction rub. No gallop.    Pulmonary:      Effort: Pulmonary effort is normal. No respiratory distress.      Breath sounds: Normal breath sounds. No stridor. No wheezing or rales.   Chest:      Chest wall: No tenderness.   Abdominal:      General: Bowel sounds are normal. There is no distension.      Palpations: Abdomen is soft.      Tenderness: There is no abdominal tenderness.   Musculoskeletal:         General: No tenderness. Normal range of motion.      Cervical back: Normal range of motion.   Skin:     General: Skin is warm and dry.      Findings: No erythema.   Neurological:      Mental Status: He is alert and oriented to " person, place, and time.   Psychiatric:         Behavior: Behavior normal.         Assessment/Plan:     Diagnoses and all orders for this visit:    1. Major depressive disorder with current active episode, unspecified depression episode severity, unspecified whether recurrent  -     DULoxetine (Cymbalta) 60 MG capsule; Take 1 capsule by mouth Daily.  Dispense: 30 capsule; Refill: 2       Pt marked improvement with current regimen. SANDY-7 markedly improved. States he does feel medicine wearing of 1 hour prior to dosage but he usually takes it at night and is feeling well in the AM. He would not like to change medications at this time. May consider splitting dosage to 30 BID to prevent this dip in the future. Will need to check with insurance to ensure this regimen would be covered.   generalized anxiety disorder  Follow-up:     Return in about 3 months (around 9/1/2021).    Goals     • Other      Improvement in depressive symptoms  Barrier: None            Preventative:    Vaccines Recommended at this visit:   No Vaccines recommended today. Patient is up to date on all vaccines.     Vaccines Received at this visit:  No Vaccines recommended today. Patient is up to date on all vaccines.     Screenings Recommended at this visit:  No Screenings offered today. Patient is up to date on all screenings at this time.     Screenings Ordered at this visit:  No screenings were offered today. Patient is up to date on all screenings.     Smoking Status:  Patient has never smoked.    Alcohol Intake:  Regular (moderate)    Patient's Body mass index is 32.67 kg/m². indicating that he is obese (BMI >30). Obesity-related health conditions include the following: none. Obesity is unchanged. BMI is is above average; BMI management plan is completed. We discussed portion control and increasing exercise..         RISK SCORE: 3          This document has been electronically signed by Allison Babin MD on June 1, 2021 15:12 CDT

## 2021-06-01 NOTE — PROGRESS NOTES
I have spoken with the patient .   I have reviewed the notes, assessments, and/or procedures performed by Dr. Allison Babin, I concur with his  documentation and assessment and plan for Ricki Andrade.          This document has been electronically signed by Ed Palmer MD on June 1, 2021 16:48 CDT             Statement Selected

## 2021-10-07 DIAGNOSIS — F32.9 MAJOR DEPRESSIVE DISORDER WITH CURRENT ACTIVE EPISODE, UNSPECIFIED DEPRESSION EPISODE SEVERITY, UNSPECIFIED WHETHER RECURRENT: ICD-10-CM

## 2021-10-07 RX ORDER — DULOXETIN HYDROCHLORIDE 60 MG/1
60 CAPSULE, DELAYED RELEASE ORAL DAILY
Qty: 30 CAPSULE | Refills: 2 | Status: SHIPPED | OUTPATIENT
Start: 2021-10-07 | End: 2022-01-06 | Stop reason: SDUPTHER

## 2021-10-12 ENCOUNTER — TELEPHONE (OUTPATIENT)
Dept: FAMILY MEDICINE CLINIC | Facility: CLINIC | Age: 26
End: 2021-10-12

## 2021-10-12 NOTE — TELEPHONE ENCOUNTER
Called patient and got his EC. Attempted to schedule an apt with our office for medication refills.  They will call back to schedule when they are able to get off to come in.    Thanks,  Lily

## 2022-01-06 ENCOUNTER — OFFICE VISIT (OUTPATIENT)
Dept: FAMILY MEDICINE CLINIC | Facility: CLINIC | Age: 27
End: 2022-01-06

## 2022-01-06 ENCOUNTER — LAB (OUTPATIENT)
Dept: LAB | Facility: HOSPITAL | Age: 27
End: 2022-01-06

## 2022-01-06 VITALS
HEART RATE: 120 BPM | HEIGHT: 69 IN | DIASTOLIC BLOOD PRESSURE: 70 MMHG | WEIGHT: 222.7 LBS | OXYGEN SATURATION: 99 % | SYSTOLIC BLOOD PRESSURE: 124 MMHG | TEMPERATURE: 98.6 F | BODY MASS INDEX: 32.98 KG/M2

## 2022-01-06 DIAGNOSIS — J30.2 SEASONAL ALLERGIES: ICD-10-CM

## 2022-01-06 DIAGNOSIS — Z23 VACCINE FOR TETANUS TOXOID: Primary | ICD-10-CM

## 2022-01-06 DIAGNOSIS — E66.9 OBESITY (BMI 30-39.9): ICD-10-CM

## 2022-01-06 DIAGNOSIS — Z11.59 ENCOUNTER FOR HEPATITIS C SCREENING TEST FOR LOW RISK PATIENT: ICD-10-CM

## 2022-01-06 DIAGNOSIS — R53.82 CHRONIC FATIGUE: ICD-10-CM

## 2022-01-06 DIAGNOSIS — F32.9 MAJOR DEPRESSIVE DISORDER WITH CURRENT ACTIVE EPISODE, UNSPECIFIED DEPRESSION EPISODE SEVERITY, UNSPECIFIED WHETHER RECURRENT: ICD-10-CM

## 2022-01-06 DIAGNOSIS — N52.8 OTHER MALE ERECTILE DYSFUNCTION: ICD-10-CM

## 2022-01-06 PROCEDURE — 85025 COMPLETE CBC W/AUTO DIFF WBC: CPT

## 2022-01-06 PROCEDURE — 90714 TD VACC NO PRESV 7 YRS+ IM: CPT | Performed by: STUDENT IN AN ORGANIZED HEALTH CARE EDUCATION/TRAINING PROGRAM

## 2022-01-06 PROCEDURE — 83036 HEMOGLOBIN GLYCOSYLATED A1C: CPT

## 2022-01-06 PROCEDURE — 84443 ASSAY THYROID STIM HORMONE: CPT

## 2022-01-06 PROCEDURE — 90471 IMMUNIZATION ADMIN: CPT | Performed by: STUDENT IN AN ORGANIZED HEALTH CARE EDUCATION/TRAINING PROGRAM

## 2022-01-06 PROCEDURE — 36415 COLL VENOUS BLD VENIPUNCTURE: CPT

## 2022-01-06 PROCEDURE — 85007 BL SMEAR W/DIFF WBC COUNT: CPT

## 2022-01-06 PROCEDURE — 99213 OFFICE O/P EST LOW 20 MIN: CPT | Performed by: STUDENT IN AN ORGANIZED HEALTH CARE EDUCATION/TRAINING PROGRAM

## 2022-01-06 PROCEDURE — 84403 ASSAY OF TOTAL TESTOSTERONE: CPT

## 2022-01-06 PROCEDURE — 86803 HEPATITIS C AB TEST: CPT

## 2022-01-06 PROCEDURE — 80053 COMPREHEN METABOLIC PANEL: CPT

## 2022-01-06 RX ORDER — FEXOFENADINE HCL 180 MG/1
180 TABLET ORAL DAILY
Qty: 30 TABLET | Refills: 3 | Status: SHIPPED | OUTPATIENT
Start: 2022-01-06 | End: 2022-02-24 | Stop reason: SDUPTHER

## 2022-01-06 RX ORDER — BUPROPION HYDROCHLORIDE 150 MG/1
150 TABLET ORAL EVERY MORNING
Qty: 30 TABLET | Refills: 1 | Status: SHIPPED | OUTPATIENT
Start: 2022-01-06 | End: 2022-02-24

## 2022-01-06 RX ORDER — DULOXETIN HYDROCHLORIDE 30 MG/1
30 CAPSULE, DELAYED RELEASE ORAL DAILY
Qty: 30 CAPSULE | Refills: 1 | Status: SHIPPED | OUTPATIENT
Start: 2022-01-06 | End: 2022-02-24 | Stop reason: SDUPTHER

## 2022-01-07 LAB
ALBUMIN SERPL-MCNC: 4.7 G/DL (ref 3.5–5.2)
ALBUMIN/GLOB SERPL: 1.7 G/DL
ALP SERPL-CCNC: 91 U/L (ref 39–117)
ALT SERPL W P-5'-P-CCNC: 60 U/L (ref 1–41)
ANION GAP SERPL CALCULATED.3IONS-SCNC: 10 MMOL/L (ref 5–15)
AST SERPL-CCNC: 36 U/L (ref 1–40)
BILIRUB SERPL-MCNC: 0.3 MG/DL (ref 0–1.2)
BUN SERPL-MCNC: 12 MG/DL (ref 6–20)
BUN/CREAT SERPL: 13.2 (ref 7–25)
CALCIUM SPEC-SCNC: 9.5 MG/DL (ref 8.6–10.5)
CHLORIDE SERPL-SCNC: 102 MMOL/L (ref 98–107)
CO2 SERPL-SCNC: 27 MMOL/L (ref 22–29)
CREAT SERPL-MCNC: 0.91 MG/DL (ref 0.76–1.27)
DEPRECATED RDW RBC AUTO: 41.4 FL (ref 37–54)
EOSINOPHIL # BLD MANUAL: 0.73 10*3/MM3 (ref 0–0.4)
EOSINOPHIL NFR BLD MANUAL: 7 % (ref 0.3–6.2)
ERYTHROCYTE [DISTWIDTH] IN BLOOD BY AUTOMATED COUNT: 13.2 % (ref 12.3–15.4)
GFR SERPL CREATININE-BSD FRML MDRD: 102 ML/MIN/1.73
GLOBULIN UR ELPH-MCNC: 2.7 GM/DL
GLUCOSE SERPL-MCNC: 110 MG/DL (ref 65–99)
HBA1C MFR BLD: 5.73 % (ref 4.8–5.6)
HCT VFR BLD AUTO: 47.3 % (ref 37.5–51)
HCV AB SER DONR QL: NORMAL
HGB BLD-MCNC: 16.1 G/DL (ref 13–17.7)
LYMPHOCYTES # BLD MANUAL: 3.25 10*3/MM3 (ref 0.7–3.1)
LYMPHOCYTES NFR BLD MANUAL: 7 % (ref 5–12)
MCH RBC QN AUTO: 29.7 PG (ref 26.6–33)
MCHC RBC AUTO-ENTMCNC: 34 G/DL (ref 31.5–35.7)
MCV RBC AUTO: 87.1 FL (ref 79–97)
MONOCYTES # BLD: 0.73 10*3/MM3 (ref 0.1–0.9)
NEUTROPHILS # BLD AUTO: 5.77 10*3/MM3 (ref 1.7–7)
NEUTROPHILS NFR BLD MANUAL: 55 % (ref 42.7–76)
PLAT MORPH BLD: NORMAL
PLATELET # BLD AUTO: 260 10*3/MM3 (ref 140–450)
PMV BLD AUTO: 12.3 FL (ref 6–12)
POTASSIUM SERPL-SCNC: 4.2 MMOL/L (ref 3.5–5.2)
PROT SERPL-MCNC: 7.4 G/DL (ref 6–8.5)
RBC # BLD AUTO: 5.43 10*6/MM3 (ref 4.14–5.8)
RBC MORPH BLD: NORMAL
SODIUM SERPL-SCNC: 139 MMOL/L (ref 136–145)
TESTOST SERPL-MCNC: 228 NG/DL (ref 249–836)
TSH SERPL DL<=0.05 MIU/L-ACNC: 5.34 UIU/ML (ref 0.27–4.2)
VARIANT LYMPHS NFR BLD MANUAL: 31 % (ref 19.6–45.3)
WBC MORPH BLD: NORMAL
WBC NRBC COR # BLD: 10.49 10*3/MM3 (ref 3.4–10.8)

## 2022-01-14 ENCOUNTER — DOCUMENTATION (OUTPATIENT)
Dept: FAMILY MEDICINE CLINIC | Facility: CLINIC | Age: 27
End: 2022-01-14

## 2022-01-14 ENCOUNTER — TELEPHONE (OUTPATIENT)
Dept: FAMILY MEDICINE CLINIC | Facility: CLINIC | Age: 27
End: 2022-01-14

## 2022-01-14 NOTE — TELEPHONE ENCOUNTER
PATIENT CALLED FOR LAB RESULTS AND WHEN HE NEEDS TO COME IN FOR  FOLLOW VISIT. CALL BACK NUMBER [phone]

## 2022-01-14 NOTE — PROGRESS NOTES
Tempted to call patient with results of labs after telephone request for lab results and follow-up information.    Phone number provided, which matched phone number on record, did not have a voicemail attached and there was no answer.    Patient has not signed up for [employer], so no message via [employer] possible.      This document has been electronically signed by Patricio Joshi MD on January 14, 2022 16:32 CST    Patricio Joshi MD PGY-3  Part of this note may be an electronic transcription/translation of spoken language to printed text using the Dragon Dictation System.

## 2022-01-20 NOTE — PROGRESS NOTES
I have seen the patient.  I have reviewed the notes, assessments, and/or procedures performed by dr Horton, I concur with her/his documentation and assessment and plan for Ricki Andrade.               This document has been electronically signed by Neftali Mcgovern MD on January 20, 2022 10:29 CST

## 2022-01-20 NOTE — PROGRESS NOTES
[facility]  He Horton MD    Subjective:     Ricki Andrade is a 26 y.o. male who presents for management of his Depression, Anxiety, and allergies.    Patient here mostly to do refills.  He mentions that he does have an issue with smoking.  Also feels he still has times where he is anxious.    Would like refills on his other medications if possible as well.    Feels less energetic and finds his libido has diminished.    The following portions of the patient's history were reviewed and updated as appropriate: allergies, current medications, past family history, past medical history, past social history, past surgical history and problem list.    Past Medical Hx:  History reviewed. No pertinent past medical history.    Past Surgical Hx:  History reviewed. No pertinent surgical history.    Current Meds:    Current Outpatient Medications:   •  DULoxetine (Cymbalta) 30 MG capsule, Take 1 capsule by mouth Daily., Disp: 30 capsule, Rfl: 1  •  fexofenadine (ALLEGRA) 180 MG tablet, Take 1 tablet by mouth Daily., Disp: 30 tablet, Rfl: 3  •  buPROPion XL (Wellbutrin XL) 150 MG 24 hr tablet, Take 1 tablet by mouth Every Morning., Disp: 30 tablet, Rfl: 1    Allergies:  Allergies   Allergen Reactions   • Cephalexin Unknown - High Severity       Family Hx:  History reviewed. No pertinent family history.     Social History:  Social History     Socioeconomic History   • Marital status: Single   Tobacco Use   • Smoking status: Never Smoker   • Smokeless tobacco: Never Used   Substance and Sexual Activity   • Alcohol use: Yes     Alcohol/week: 4.0 standard drinks     Types: 2 Cans of beer, 1 Shots of liquor, 1 Glasses of wine per week   • Drug use: Never       Review of Systems  Review of Systems   Constitutional: Negative for chills and fever.   HENT: Negative for congestion, rhinorrhea and sore throat.    Eyes: Negative for visual disturbance.   Respiratory: Negative for chest tightness and shortness  "of breath.    Cardiovascular: Negative for chest pain and palpitations.   Gastrointestinal: Negative for abdominal pain, diarrhea, nausea and vomiting.   Endocrine: Negative for polyuria.   Genitourinary: Negative for difficulty urinating and dysuria.   Musculoskeletal: Negative for back pain and myalgias.   Skin: Negative for rash and wound.   Neurological: Negative for dizziness, weakness and numbness.   Hematological: Does not bruise/bleed easily.   Psychiatric/Behavioral: Negative for agitation, behavioral problems and confusion.       Objective:     /70   Pulse 120   Temp 98.6 °F (37 °C)   Ht 175.3 cm (69\")   Wt 101 kg (222 lb 11.2 oz)   SpO2 99%   BMI 32.89 kg/m²   Physical Exam  Constitutional:       General: He is not in acute distress.  HENT:      Head: Normocephalic and atraumatic.      Right Ear: External ear normal.      Left Ear: External ear normal.   Cardiovascular:      Rate and Rhythm: Normal rate and regular rhythm.      Heart sounds: Normal heart sounds.   Pulmonary:      Effort: Pulmonary effort is normal. No respiratory distress.      Breath sounds: Normal breath sounds.   Abdominal:      General: Bowel sounds are normal.      Palpations: Abdomen is soft.      Tenderness: There is no abdominal tenderness.   Musculoskeletal:      Right lower leg: No edema.      Left lower leg: No edema.   Skin:     General: Skin is warm and dry.   Neurological:      Mental Status: He is alert.      Comments: Moving all extremities   Psychiatric:         Mood and Affect: Mood normal.         Behavior: Behavior normal.          Assessment/Plan:     Diagnoses and all orders for this visit:    1. Vaccine for tetanus toxoid (Primary)  Due for tetanus shot  -     tetanus-diphtheria toxoids (TENIVAC 5-2) injection 0.5 mL    2. Major depressive disorder with current active episode, unspecified depression episode severity, unspecified whether recurrent  Will add wellbutrin   -     DULoxetine (Cymbalta) 30 MG " capsule; Take 1 capsule by mouth Daily.  Dispense: 30 capsule; Refill: 1  -     buPROPion XL (Wellbutrin XL) 150 MG 24 hr tablet; Take 1 tablet by mouth Every Morning.  Dispense: 30 tablet; Refill: 1    3. Seasonal allergies  Stable on current regimen  -     fexofenadine (ALLEGRA) 180 MG tablet; Take 1 tablet by mouth Daily.  Dispense: 30 tablet; Refill: 3    4. Obesity (BMI 30-39.9)  Risk factors need to be id'd  -     Hemoglobin A1c; Future    5. Encounter for hepatitis C screening test for low risk patient  -     Hepatitis C antibody; Future    6. Other male erectile dysfunction  Patient wanted to check his testosterone level because he has been having issues with libido.  -     buPROPion XL (Wellbutrin XL) 150 MG 24 hr tablet; Take 1 tablet by mouth Every Morning.  Dispense: 30 tablet; Refill: 1  -     Hepatitis C antibody; Future  -     Testosterone; Future    7. Chronic fatigue  Patient feels low on energy so will draw labs for possible organic causes  -     Testosterone; Future  -     Comprehensive metabolic panel; Future  -     CBC w AUTO Differential; Future  -     TSH; Future          Follow-up:     2 months    Preventative:  Health Maintenance   Topic Date Due   • ANNUAL PHYSICAL  Never done   • COVID-19 Vaccine (1) Never done   • INFLUENZA VACCINE  Never done   • TDAP/TD VACCINES (2 - Tdap) 01/06/2032   • HEPATITIS C SCREENING  Completed   • Pneumococcal Vaccine 0-64  Aged Out       Alcohol use:  reports current alcohol use of about 4.0 standard drinks of alcohol per week.  Nicotine status  reports that he has never smoked. He has never used smokeless tobacco.    Goals     • Other      Improvement in depressive symptoms  Barrier: None            RISK SCORE: 1      This document has been electronically signed by He Horton MD on January 19, 2022 23:27 CST

## 2022-01-24 ENCOUNTER — TELEPHONE (OUTPATIENT)
Dept: FAMILY MEDICINE CLINIC | Facility: CLINIC | Age: 27
End: 2022-01-24

## 2022-01-24 DIAGNOSIS — E03.8 OTHER SPECIFIED HYPOTHYROIDISM: Primary | ICD-10-CM

## 2022-01-24 RX ORDER — LEVOTHYROXINE SODIUM 0.05 MG/1
50 TABLET ORAL
Qty: 30 TABLET | Refills: 2 | Status: SHIPPED | OUTPATIENT
Start: 2022-01-24 | End: 2022-02-24 | Stop reason: SDUPTHER

## 2022-01-24 NOTE — PROGRESS NOTES
Called patient to review lab results.  Of note TSH elevated and can contribute to most of his symptoms.  Will prescribe levothyroxine 50mcg QAM. F/u end of February 2022      This document has been electronically signed by He Horton MD on January 24, 2022 16:44 CST

## 2022-02-21 PROCEDURE — 87635 SARS-COV-2 COVID-19 AMP PRB: CPT | Performed by: FAMILY MEDICINE

## 2022-02-24 ENCOUNTER — TELEMEDICINE (OUTPATIENT)
Dept: FAMILY MEDICINE CLINIC | Facility: CLINIC | Age: 27
End: 2022-02-24

## 2022-02-24 VITALS — WEIGHT: 222 LBS | BODY MASS INDEX: 33.65 KG/M2 | HEIGHT: 68 IN

## 2022-02-24 DIAGNOSIS — J30.2 SEASONAL ALLERGIES: ICD-10-CM

## 2022-02-24 DIAGNOSIS — F32.9 MAJOR DEPRESSIVE DISORDER WITH CURRENT ACTIVE EPISODE, UNSPECIFIED DEPRESSION EPISODE SEVERITY, UNSPECIFIED WHETHER RECURRENT: ICD-10-CM

## 2022-02-24 DIAGNOSIS — M79.10 MYALGIA: ICD-10-CM

## 2022-02-24 DIAGNOSIS — N52.8 OTHER MALE ERECTILE DYSFUNCTION: ICD-10-CM

## 2022-02-24 DIAGNOSIS — E03.8 OTHER SPECIFIED HYPOTHYROIDISM: ICD-10-CM

## 2022-02-24 DIAGNOSIS — E29.1 HYPOGONADISM IN MALE: Primary | ICD-10-CM

## 2022-02-24 PROCEDURE — 99213 OFFICE O/P EST LOW 20 MIN: CPT | Performed by: STUDENT IN AN ORGANIZED HEALTH CARE EDUCATION/TRAINING PROGRAM

## 2022-02-24 RX ORDER — BUPROPION HYDROCHLORIDE 300 MG/1
300 TABLET ORAL EVERY MORNING
Qty: 30 TABLET | Refills: 1 | Status: SHIPPED | OUTPATIENT
Start: 2022-02-24 | End: 2022-05-04

## 2022-02-24 RX ORDER — LEVOTHYROXINE SODIUM 0.05 MG/1
50 TABLET ORAL
Qty: 30 TABLET | Refills: 2 | Status: SHIPPED | OUTPATIENT
Start: 2022-02-24 | End: 2022-07-20 | Stop reason: SDUPTHER

## 2022-02-24 RX ORDER — MELOXICAM 15 MG/1
15 TABLET ORAL DAILY
Qty: 14 TABLET | Refills: 0 | Status: SHIPPED | OUTPATIENT
Start: 2022-02-24 | End: 2022-03-07

## 2022-02-24 RX ORDER — DULOXETIN HYDROCHLORIDE 30 MG/1
30 CAPSULE, DELAYED RELEASE ORAL DAILY
Qty: 30 CAPSULE | Refills: 1 | Status: SHIPPED | OUTPATIENT
Start: 2022-02-24 | End: 2022-05-04

## 2022-02-24 RX ORDER — TESTOSTERONE CYPIONATE 50 MG/ML
100 VIAL (ML) INTRAMUSCULAR
Qty: 5 ML | Refills: 0 | Status: SHIPPED | OUTPATIENT
Start: 2022-02-24 | End: 2022-08-01 | Stop reason: SDUPTHER

## 2022-02-24 RX ORDER — BUPROPION HYDROCHLORIDE 150 MG/1
150 TABLET ORAL EVERY MORNING
Qty: 30 TABLET | Refills: 1 | Status: CANCELLED | OUTPATIENT
Start: 2022-02-24

## 2022-02-24 RX ORDER — FEXOFENADINE HCL 180 MG/1
180 TABLET ORAL DAILY
Qty: 30 TABLET | Refills: 3 | Status: SHIPPED | OUTPATIENT
Start: 2022-02-24 | End: 2022-10-17 | Stop reason: SDUPTHER

## 2022-03-01 NOTE — PROGRESS NOTES
I was present onsite throughout the encounter and discussed the patient with Dr. Horton.  I spoke with the patient by telephone as this was a virtual visit due to Covid restrictions.  I have reviewed the notes, assessments, and/or procedures performed by He Horton MD, I concur with her/his documentation and assessment and plan for Ricki Andrade.                This document has been electronically signed by Neftali Mcgovern MD on March 1, 2022 09:15 CST

## 2022-03-01 NOTE — PROGRESS NOTES
Family Medicine Residency  He Horton MD    Subjective:     Ricki Andrade is a 26 y.o. male who presents for follow up for his reduced libido.    You have chosen to receive care through a telephone visit. Do you consent to use a telephone visit for your medical care today? Yes    Patient is quarantining for COVID at this time.  No symptoms from COVID aside from some diarrhea.    Patient is still having issues with libido after starting levothyroxine.  Was wondering if he could now start testosterone.  Gave warnings of topical vs IM.  Patient opted for IM.      The following portions of the patient's history were reviewed and updated as appropriate: allergies, current medications, past family history, past medical history, past social history, past surgical history and problem list.       Past Medical Hx:  History reviewed. No pertinent past medical history.    Past Surgical Hx:  History reviewed. No pertinent surgical history.    Current Meds:    Current Outpatient Medications:   •  buPROPion XL (Wellbutrin XL) 300 MG 24 hr tablet, Take 1 tablet by mouth Every Morning., Disp: 30 tablet, Rfl: 1  •  DULoxetine (Cymbalta) 30 MG capsule, Take 1 capsule by mouth Daily., Disp: 30 capsule, Rfl: 1  •  fexofenadine (ALLEGRA) 180 MG tablet, Take 1 tablet by mouth Daily., Disp: 30 tablet, Rfl: 3  •  levothyroxine (Synthroid) 50 MCG tablet, Take 1 tablet by mouth Every Morning., Disp: 30 tablet, Rfl: 2  •  meloxicam (Mobic) 15 MG tablet, Take 1 tablet by mouth Daily., Disp: 14 tablet, Rfl: 0  •  Testosterone Cypionate 50 MG/ML solution, Inject 100 mg as directed Every 14 (Fourteen) Days., Disp: 5 mL, Rfl: 0    Allergies:  Allergies   Allergen Reactions   • Cephalexin Unknown - High Severity       Family Hx:  History reviewed. No pertinent family history.     Social History:  Social History     Socioeconomic History   • Marital status: Single   Tobacco Use   • Smoking status: Never Smoker   • Smokeless tobacco:  "Never Used   Substance and Sexual Activity   • Alcohol use: Yes     Alcohol/week: 4.0 standard drinks     Types: 2 Cans of beer, 1 Shots of liquor, 1 Glasses of wine per week   • Drug use: Never       Review of Systems  Review of Systems   Constitutional: Negative for chills and fever.   HENT: Negative for congestion, rhinorrhea and sore throat.    Eyes: Negative for visual disturbance.   Respiratory: Negative for chest tightness and shortness of breath.    Cardiovascular: Negative for chest pain and palpitations.   Gastrointestinal: Negative for abdominal pain, diarrhea, nausea and vomiting.   Endocrine: Negative for polyuria.   Genitourinary: Negative for difficulty urinating and dysuria.   Musculoskeletal: Negative for back pain and myalgias.   Skin: Negative for rash and wound.   Neurological: Negative for dizziness, weakness and numbness.   Hematological: Does not bruise/bleed easily.   Psychiatric/Behavioral: Negative for agitation, behavioral problems and confusion.       Objective:     Ht 172.7 cm (68\")   Wt 101 kg (222 lb)   BMI 33.75 kg/m²   Physical Exam  Pulmonary:      Effort: Pulmonary effort is normal.   Neurological:      General: No focal deficit present.      Mental Status: He is alert.          Assessment/Plan:     Diagnoses and all orders for this visit:    1. Hypogonadism in male (Primary)  Low testosterone on labs.  Can supplement.  -     Testosterone Cypionate 50 MG/ML solution; Inject 100 mg as directed Every 14 (Fourteen) Days.  Dispense: 5 mL; Refill: 0    2. Major depressive disorder with current active episode, unspecified depression episode severity, unspecified whether recurrent  Stable.  Refill.  -     DULoxetine (Cymbalta) 30 MG capsule; Take 1 capsule by mouth Daily.  Dispense: 30 capsule; Refill: 1  -     buPROPion XL (Wellbutrin XL) 300 MG 24 hr tablet; Take 1 tablet by mouth Every Morning.  Dispense: 30 tablet; Refill: 1    3. Other male erectile dysfunction  Stable.  Refill.  - "     buPROPion XL (Wellbutrin XL) 300 MG 24 hr tablet; Take 1 tablet by mouth Every Morning.  Dispense: 30 tablet; Refill: 1  -     Testosterone Cypionate 50 MG/ML solution; Inject 100 mg as directed Every 14 (Fourteen) Days.  Dispense: 5 mL; Refill: 0    4. Seasonal allergies  Stable.  Refill.  -     fexofenadine (ALLEGRA) 180 MG tablet; Take 1 tablet by mouth Daily.  Dispense: 30 tablet; Refill: 3    5. Other specified hypothyroidism  Stable.  Refill.  -     levothyroxine (Synthroid) 50 MCG tablet; Take 1 tablet by mouth Every Morning.  Dispense: 30 tablet; Refill: 2    6. Myalgia  Has had some muscle aches.  -     meloxicam (Mobic) 15 MG tablet; Take 1 tablet by mouth Daily.  Dispense: 14 tablet; Refill: 0             Follow-up:     1 month    Preventative:  Health Maintenance   Topic Date Due   • ANNUAL PHYSICAL  Never done   • COVID-19 Vaccine (1) Never done   • INFLUENZA VACCINE  Never done   • TDAP/TD VACCINES (2 - Tdap) 01/06/2032   • HEPATITIS C SCREENING  Completed   • Pneumococcal Vaccine 0-64  Aged Out       Alcohol use:  reports current alcohol use of about 4.0 standard drinks of alcohol per week.  Nicotine status  reports that he has never smoked. He has never used smokeless tobacco.    RISK SCORE: 1     This was a telephone encounter that lasted 30 minutes.      This document has been electronically signed by He Horton MD on March 1, 2022 00:52 CST

## 2022-03-04 DIAGNOSIS — N52.8 OTHER MALE ERECTILE DYSFUNCTION: ICD-10-CM

## 2022-03-04 DIAGNOSIS — F32.9 MAJOR DEPRESSIVE DISORDER WITH CURRENT ACTIVE EPISODE, UNSPECIFIED DEPRESSION EPISODE SEVERITY, UNSPECIFIED WHETHER RECURRENT: ICD-10-CM

## 2022-03-06 RX ORDER — DULOXETIN HYDROCHLORIDE 30 MG/1
30 CAPSULE, DELAYED RELEASE ORAL DAILY
Qty: 30 CAPSULE | Refills: 1 | OUTPATIENT
Start: 2022-03-06

## 2022-03-06 RX ORDER — BUPROPION HYDROCHLORIDE 150 MG/1
150 TABLET ORAL EVERY MORNING
Qty: 30 TABLET | Refills: 1 | OUTPATIENT
Start: 2022-03-06

## 2022-03-07 DIAGNOSIS — M79.10 MYALGIA: ICD-10-CM

## 2022-03-07 RX ORDER — MELOXICAM 15 MG/1
15 TABLET ORAL DAILY
Qty: 14 TABLET | Refills: 0 | Status: SHIPPED | OUTPATIENT
Start: 2022-03-07 | End: 2022-03-18

## 2022-03-18 DIAGNOSIS — M79.10 MYALGIA: ICD-10-CM

## 2022-03-18 RX ORDER — MELOXICAM 15 MG/1
15 TABLET ORAL DAILY
Qty: 14 TABLET | Refills: 0 | Status: SHIPPED | OUTPATIENT
Start: 2022-03-18 | End: 2022-08-01

## 2022-05-04 DIAGNOSIS — N52.8 OTHER MALE ERECTILE DYSFUNCTION: ICD-10-CM

## 2022-05-04 DIAGNOSIS — F32.9 MAJOR DEPRESSIVE DISORDER WITH CURRENT ACTIVE EPISODE, UNSPECIFIED DEPRESSION EPISODE SEVERITY, UNSPECIFIED WHETHER RECURRENT: ICD-10-CM

## 2022-05-04 RX ORDER — BUPROPION HYDROCHLORIDE 300 MG/1
300 TABLET ORAL EVERY MORNING
Qty: 30 TABLET | Refills: 1 | Status: SHIPPED | OUTPATIENT
Start: 2022-05-04 | End: 2022-07-13

## 2022-05-04 RX ORDER — DULOXETIN HYDROCHLORIDE 30 MG/1
30 CAPSULE, DELAYED RELEASE ORAL DAILY
Qty: 30 CAPSULE | Refills: 1 | Status: SHIPPED | OUTPATIENT
Start: 2022-05-04 | End: 2022-07-13

## 2022-07-05 DIAGNOSIS — N52.8 OTHER MALE ERECTILE DYSFUNCTION: ICD-10-CM

## 2022-07-05 DIAGNOSIS — F32.9 MAJOR DEPRESSIVE DISORDER WITH CURRENT ACTIVE EPISODE, UNSPECIFIED DEPRESSION EPISODE SEVERITY, UNSPECIFIED WHETHER RECURRENT: ICD-10-CM

## 2022-07-13 RX ORDER — DULOXETIN HYDROCHLORIDE 30 MG/1
30 CAPSULE, DELAYED RELEASE ORAL DAILY
Qty: 30 CAPSULE | Refills: 1 | Status: SHIPPED | OUTPATIENT
Start: 2022-07-13 | End: 2022-09-12

## 2022-07-13 RX ORDER — BUPROPION HYDROCHLORIDE 300 MG/1
300 TABLET ORAL EVERY MORNING
Qty: 30 TABLET | Refills: 1 | Status: SHIPPED | OUTPATIENT
Start: 2022-07-13 | End: 2022-09-12

## 2022-07-19 ENCOUNTER — LAB (OUTPATIENT)
Dept: LAB | Facility: HOSPITAL | Age: 27
End: 2022-07-19

## 2022-07-19 ENCOUNTER — OFFICE VISIT (OUTPATIENT)
Dept: FAMILY MEDICINE CLINIC | Facility: CLINIC | Age: 27
End: 2022-07-19

## 2022-07-19 VITALS
TEMPERATURE: 96.8 F | HEIGHT: 68 IN | DIASTOLIC BLOOD PRESSURE: 74 MMHG | WEIGHT: 213.9 LBS | HEART RATE: 128 BPM | SYSTOLIC BLOOD PRESSURE: 126 MMHG | OXYGEN SATURATION: 98 % | BODY MASS INDEX: 32.42 KG/M2

## 2022-07-19 DIAGNOSIS — E03.8 OTHER SPECIFIED HYPOTHYROIDISM: ICD-10-CM

## 2022-07-19 DIAGNOSIS — R73.03 PREDIABETES: ICD-10-CM

## 2022-07-19 DIAGNOSIS — E29.1 HYPOGONADISM IN MALE: ICD-10-CM

## 2022-07-19 DIAGNOSIS — E03.8 OTHER SPECIFIED HYPOTHYROIDISM: Primary | ICD-10-CM

## 2022-07-19 LAB
AMPHET+METHAMPHET UR QL: NEGATIVE
AMPHETAMINES UR QL: NEGATIVE
BARBITURATES UR QL SCN: NEGATIVE
BENZODIAZ UR QL SCN: NEGATIVE
BUPRENORPHINE SERPL-MCNC: NEGATIVE NG/ML
CANNABINOIDS SERPL QL: NEGATIVE
COCAINE UR QL: NEGATIVE
METHADONE UR QL SCN: NEGATIVE
OPIATES UR QL: NEGATIVE
OXYCODONE UR QL SCN: NEGATIVE
PCP UR QL SCN: NEGATIVE
PROPOXYPH UR QL: NEGATIVE
TRICYCLICS UR QL SCN: NEGATIVE

## 2022-07-19 PROCEDURE — 99213 OFFICE O/P EST LOW 20 MIN: CPT | Performed by: STUDENT IN AN ORGANIZED HEALTH CARE EDUCATION/TRAINING PROGRAM

## 2022-07-19 PROCEDURE — 84439 ASSAY OF FREE THYROXINE: CPT

## 2022-07-19 PROCEDURE — 83036 HEMOGLOBIN GLYCOSYLATED A1C: CPT

## 2022-07-19 PROCEDURE — 80306 DRUG TEST PRSMV INSTRMNT: CPT

## 2022-07-19 PROCEDURE — 84443 ASSAY THYROID STIM HORMONE: CPT

## 2022-07-19 PROCEDURE — 36415 COLL VENOUS BLD VENIPUNCTURE: CPT

## 2022-07-19 RX ORDER — METFORMIN HYDROCHLORIDE 500 MG/1
500 TABLET, EXTENDED RELEASE ORAL
Qty: 30 TABLET | Refills: 0 | Status: SHIPPED | OUTPATIENT
Start: 2022-07-19 | End: 2022-08-15

## 2022-07-20 LAB
HBA1C MFR BLD: 5.9 % (ref 4.8–5.6)
T4 FREE SERPL-MCNC: 0.92 NG/DL (ref 0.93–1.7)
TSH SERPL DL<=0.05 MIU/L-ACNC: 1.71 UIU/ML (ref 0.27–4.2)

## 2022-07-20 RX ORDER — LEVOTHYROXINE SODIUM 88 UG/1
88 TABLET ORAL
Qty: 30 TABLET | Refills: 2 | Status: SHIPPED | OUTPATIENT
Start: 2022-07-20 | End: 2022-10-06 | Stop reason: DRUGHIGH

## 2022-07-20 NOTE — PROGRESS NOTES
Family Medicine Residency  He Horton MD    Subjective:     Ricki Andrade is a 27 y.o. male who presents for management of his thyroid disease, hypogonadism, and his prediabetes.    Patient recently had covid but has not had any persistent symptoms.  At this time states that his mood improvement has plateaued.  Also had an issue with filling his testosterone.  Did not receive any notice as to why the testosterone was denied.    The following portions of the patient's history were reviewed and updated as appropriate: allergies, current medications, past family history, past medical history, past social history, past surgical history and problem list.    Past Medical Hx:  History reviewed. No pertinent past medical history.    Past Surgical Hx:  History reviewed. No pertinent surgical history.    Current Meds:    Current Outpatient Medications:   •  buPROPion XL (WELLBUTRIN XL) 300 MG 24 hr tablet, TAKE 1 TABLET BY MOUTH EVERY MORNING, Disp: 30 tablet, Rfl: 1  •  DULoxetine (CYMBALTA) 30 MG capsule, TAKE 1 CAPSULE BY MOUTH DAILY, Disp: 30 capsule, Rfl: 1  •  fexofenadine (ALLEGRA) 180 MG tablet, Take 1 tablet by mouth Daily., Disp: 30 tablet, Rfl: 3  •  levothyroxine (Synthroid) 88 MCG tablet, Take 1 tablet by mouth Every Morning., Disp: 30 tablet, Rfl: 2  •  meloxicam (MOBIC) 15 MG tablet, TAKE 1 TABLET BY MOUTH DAILY, Disp: 14 tablet, Rfl: 0  •  metFORMIN ER (GLUCOPHAGE-XR) 500 MG 24 hr tablet, Take 1 tablet by mouth Daily With Breakfast., Disp: 30 tablet, Rfl: 0  •  Testosterone Cypionate 50 MG/ML solution, Inject 100 mg as directed Every 14 (Fourteen) Days., Disp: 5 mL, Rfl: 0    Allergies:  Allergies   Allergen Reactions   • Cephalexin Unknown - High Severity       Family Hx:  History reviewed. No pertinent family history.     Social History:  Social History     Socioeconomic History   • Marital status: Single   Tobacco Use   • Smoking status: Never Smoker   • Smokeless tobacco: Never Used  "  Substance and Sexual Activity   • Alcohol use: Yes     Alcohol/week: 4.0 standard drinks     Types: 2 Cans of beer, 1 Shots of liquor, 1 Glasses of wine per week   • Drug use: Never       Review of Systems  Review of Systems   Constitutional: Positive for fatigue. Negative for chills and fever.   HENT: Negative for congestion, rhinorrhea and sore throat.    Eyes: Negative for visual disturbance.   Respiratory: Negative for chest tightness and shortness of breath.    Cardiovascular: Negative for chest pain and palpitations.   Gastrointestinal: Negative for abdominal pain, diarrhea, nausea and vomiting.   Endocrine: Negative for polyuria.   Genitourinary: Negative for difficulty urinating and dysuria.   Musculoskeletal: Negative for back pain and myalgias.   Skin: Negative for rash and wound.   Neurological: Negative for dizziness, weakness and numbness.   Hematological: Does not bruise/bleed easily.   Psychiatric/Behavioral: Negative for agitation, behavioral problems and confusion.       Objective:     /74   Pulse (!) 128   Temp 96.8 °F (36 °C)   Ht 172.7 cm (68\")   Wt 97 kg (213 lb 14.4 oz)   SpO2 98%   BMI 32.52 kg/m²   Physical Exam  Constitutional:       General: He is not in acute distress.  HENT:      Head: Normocephalic and atraumatic.      Right Ear: External ear normal.      Left Ear: External ear normal.   Cardiovascular:      Rate and Rhythm: Normal rate and regular rhythm.      Heart sounds: Normal heart sounds.   Pulmonary:      Effort: Pulmonary effort is normal. No respiratory distress.      Breath sounds: Normal breath sounds.   Abdominal:      General: Bowel sounds are normal.      Palpations: Abdomen is soft.      Tenderness: There is no abdominal tenderness.   Musculoskeletal:      Right lower leg: No edema.      Left lower leg: No edema.   Skin:     General: Skin is warm and dry.   Neurological:      Mental Status: He is alert.      Comments: Moving all extremities   Psychiatric:    "      Mood and Affect: Mood normal.         Behavior: Behavior normal.          Assessment/Plan:     Diagnoses and all orders for this visit:    1. Other specified hypothyroidism (Primary)  TSH High, will increase from 50 to 88.  -     TSH+Free T4; Future  -     levothyroxine (Synthroid) 88 MCG tablet; Take 1 tablet by mouth Every Morning.  Dispense: 30 tablet; Refill: 2    2. Hypogonadism in male  Issues getting injectable testosterone covered.  Issue unknown.  Likely a formulation issue.  Will have to look into why patient is being denied.  -     Urine Drug Screen - Urine, Clean Catch; Future    3. Prediabetes  Will try metformin and see if patient can tolerate medication.  -     metFORMIN ER (GLUCOPHAGE-XR) 500 MG 24 hr tablet; Take 1 tablet by mouth Daily With Breakfast.  Dispense: 30 tablet; Refill: 0  -     Hemoglobin A1c; Future             Follow-up:     1 month    Preventative:  Health Maintenance   Topic Date Due   • COVID-19 Vaccine (1) Never done   • ANNUAL PHYSICAL  Never done   • INFLUENZA VACCINE  10/01/2022   • TDAP/TD VACCINES (2 - Tdap) 01/06/2032   • HEPATITIS C SCREENING  Completed   • Pneumococcal Vaccine 0-64  Aged Out       Alcohol use:  reports current alcohol use of about 4.0 standard drinks of alcohol per week.  Nicotine status  reports that he has never smoked. He has never used smokeless tobacco.    RISK SCORE: 1      This document has been electronically signed by He Horton MD on July 21, 2022 20:24 CDT

## 2022-07-27 ENCOUNTER — TELEPHONE (OUTPATIENT)
Dept: FAMILY MEDICINE CLINIC | Facility: CLINIC | Age: 27
End: 2022-07-27

## 2022-07-27 NOTE — TELEPHONE ENCOUNTER
Patient called to see if his testosterone ever got approved. He would like someone to call him back.    [phone]      Graham Palomo

## 2022-07-30 DIAGNOSIS — M79.10 MYALGIA: ICD-10-CM

## 2022-07-31 NOTE — PROGRESS NOTES
I have seen the patient and I have reviewed the notes, assessments, and/or procedures performed by He Horton MDduring office visit. I concur with her/his documentation and assessment and plan for Ricki Andrade.           This document has been electronically signed by Neftali Mcgovern MD on July 31, 2022 14:06 CDT

## 2022-08-01 ENCOUNTER — TELEPHONE (OUTPATIENT)
Dept: FAMILY MEDICINE CLINIC | Facility: CLINIC | Age: 27
End: 2022-08-01

## 2022-08-01 DIAGNOSIS — N52.8 OTHER MALE ERECTILE DYSFUNCTION: ICD-10-CM

## 2022-08-01 DIAGNOSIS — E29.1 HYPOGONADISM IN MALE: ICD-10-CM

## 2022-08-01 RX ORDER — MELOXICAM 15 MG/1
15 TABLET ORAL DAILY
Qty: 14 TABLET | Refills: 0 | Status: SHIPPED | OUTPATIENT
Start: 2022-08-01 | End: 2022-08-12

## 2022-08-01 RX ORDER — TESTOSTERONE CYPIONATE 50 MG/ML
100 VIAL (ML) INTRAMUSCULAR
Qty: 5 ML | Refills: 0 | Status: SHIPPED | OUTPATIENT
Start: 2022-08-01 | End: 2022-09-29 | Stop reason: DRUGHIGH

## 2022-08-01 NOTE — TELEPHONE ENCOUNTER
PT CALLED WANTING TO KNOW IF Testosterone Cypionate 50 MG/ML solution WAS APPROVED.    HIS CALL BACK NUMBER [phone]

## 2022-08-01 NOTE — PROGRESS NOTES
Initial prescription for testosterone .  Re-writing the initial script now that medication is approved.  Kapser #[id number] appropriate.      This document has been electronically signed by He Horton MD on 2022 10:52 CDT

## 2022-08-12 DIAGNOSIS — M79.10 MYALGIA: ICD-10-CM

## 2022-08-12 RX ORDER — MELOXICAM 15 MG/1
15 TABLET ORAL DAILY
Qty: 14 TABLET | Refills: 0 | Status: SHIPPED | OUTPATIENT
Start: 2022-08-12 | End: 2022-09-29 | Stop reason: SDUPTHER

## 2022-08-15 DIAGNOSIS — R73.03 PREDIABETES: ICD-10-CM

## 2022-08-15 RX ORDER — METFORMIN HYDROCHLORIDE 500 MG/1
500 TABLET, EXTENDED RELEASE ORAL
Qty: 30 TABLET | Refills: 0 | Status: SHIPPED | OUTPATIENT
Start: 2022-08-15 | End: 2022-09-12

## 2022-08-19 ENCOUNTER — OFFICE VISIT (OUTPATIENT)
Dept: FAMILY MEDICINE CLINIC | Facility: CLINIC | Age: 27
End: 2022-08-19

## 2022-08-19 VITALS — BODY MASS INDEX: 32.28 KG/M2 | HEIGHT: 68 IN | WEIGHT: 213 LBS

## 2022-08-19 DIAGNOSIS — E29.1 HYPOGONADISM IN MALE: Primary | ICD-10-CM

## 2022-08-19 PROCEDURE — 99443 PR PHYS/QHP TELEPHONE EVALUATION 21-30 MIN: CPT | Performed by: STUDENT IN AN ORGANIZED HEALTH CARE EDUCATION/TRAINING PROGRAM

## 2022-08-29 NOTE — PROGRESS NOTES
Family Medicine Residency  He Horton MD    Subjective:     Ricki Andrade is a 27 y.o. male who presents for follow up after starting his testosterone.    Patient is doing well.  No concerning side effects.  No issues obtaining medication.      The following portions of the patient's history were reviewed and updated as appropriate: allergies, current medications, past family history, past medical history, past social history, past surgical history and problem list.    Past Medical Hx:  History reviewed. No pertinent past medical history.    Past Surgical Hx:  History reviewed. No pertinent surgical history.    Current Meds:    Current Outpatient Medications:   •  buPROPion XL (WELLBUTRIN XL) 300 MG 24 hr tablet, TAKE 1 TABLET BY MOUTH EVERY MORNING, Disp: 30 tablet, Rfl: 1  •  DULoxetine (CYMBALTA) 30 MG capsule, TAKE 1 CAPSULE BY MOUTH DAILY, Disp: 30 capsule, Rfl: 1  •  fexofenadine (ALLEGRA) 180 MG tablet, Take 1 tablet by mouth Daily., Disp: 30 tablet, Rfl: 3  •  levothyroxine (Synthroid) 88 MCG tablet, Take 1 tablet by mouth Every Morning., Disp: 30 tablet, Rfl: 2  •  meloxicam (MOBIC) 15 MG tablet, TAKE 1 TABLET BY MOUTH DAILY, Disp: 14 tablet, Rfl: 0  •  metFORMIN ER (GLUCOPHAGE-XR) 500 MG 24 hr tablet, TAKE 1 TABLET BY MOUTH DAILY WITH BREAKFAST, Disp: 30 tablet, Rfl: 0  •  Testosterone Cypionate 50 MG/ML solution, Inject 100 mg as directed Every 14 (Fourteen) Days., Disp: 5 mL, Rfl: 0    Allergies:  Allergies   Allergen Reactions   • Cephalexin Unknown - High Severity       Family Hx:  History reviewed. No pertinent family history.     Social History:  Social History     Socioeconomic History   • Marital status: Single   Tobacco Use   • Smoking status: Never Smoker   • Smokeless tobacco: Never Used   Substance and Sexual Activity   • Alcohol use: Yes     Alcohol/week: 4.0 standard drinks     Types: 2 Cans of beer, 1 Shots of liquor, 1 Glasses of wine per week   • Drug use: Never  "      Review of Systems  Review of Systems   Constitutional: Negative for chills and fever.   HENT: Negative for congestion, rhinorrhea and sore throat.    Eyes: Negative for visual disturbance.   Respiratory: Negative for chest tightness and shortness of breath.    Cardiovascular: Negative for chest pain and palpitations.   Gastrointestinal: Negative for abdominal pain, diarrhea, nausea and vomiting.   Endocrine: Negative for polyuria.   Genitourinary: Negative for difficulty urinating and dysuria.   Musculoskeletal: Negative for back pain and myalgias.   Skin: Negative for rash and wound.   Neurological: Negative for dizziness, weakness and numbness.   Hematological: Does not bruise/bleed easily.   Psychiatric/Behavioral: Negative for agitation, behavioral problems and confusion.       Objective:     Ht 172.7 cm (68\")   Wt 96.6 kg (213 lb)   BMI 32.39 kg/m²   Physical Exam  Neurological:      Mental Status: He is alert.   Psychiatric:         Mood and Affect: Mood normal.         Behavior: Behavior normal.         Assessment/Plan:     Diagnoses and all orders for this visit:    1. Hypogonadism in male (Primary)    Patient is doing well at this time on his current dosing of Testosterone symptoms wise.  Will continue the same dosing for now.  F/u in 1 month         Follow-up:     1 month    Preventative:  Health Maintenance   Topic Date Due   • COVID-19 Vaccine (1) Never done   • ANNUAL PHYSICAL  Never done   • INFLUENZA VACCINE  10/01/2022   • TDAP/TD VACCINES (2 - Tdap) 01/06/2032   • HEPATITIS C SCREENING  Completed   • Pneumococcal Vaccine 0-64  Aged Out       Alcohol use:  reports current alcohol use of about 4.0 standard drinks of alcohol per week.  Nicotine status  reports that he has never smoked. He has never used smokeless tobacco.    RISK SCORE: 1    This was a telephone visit that the patient consented to and lasted 30 minutes.      This document has been electronically signed by He Horton MD on " August 29, 2022 00:07 CDT

## 2022-09-12 DIAGNOSIS — E29.1 HYPOGONADISM IN MALE: ICD-10-CM

## 2022-09-12 DIAGNOSIS — F32.9 MAJOR DEPRESSIVE DISORDER WITH CURRENT ACTIVE EPISODE, UNSPECIFIED DEPRESSION EPISODE SEVERITY, UNSPECIFIED WHETHER RECURRENT: ICD-10-CM

## 2022-09-12 DIAGNOSIS — R73.03 PREDIABETES: ICD-10-CM

## 2022-09-12 DIAGNOSIS — N52.8 OTHER MALE ERECTILE DYSFUNCTION: ICD-10-CM

## 2022-09-12 RX ORDER — DULOXETIN HYDROCHLORIDE 30 MG/1
30 CAPSULE, DELAYED RELEASE ORAL DAILY
Qty: 30 CAPSULE | Refills: 1 | Status: SHIPPED | OUTPATIENT
Start: 2022-09-12 | End: 2022-10-18 | Stop reason: SDUPTHER

## 2022-09-12 RX ORDER — BUPROPION HYDROCHLORIDE 300 MG/1
300 TABLET ORAL EVERY MORNING
Qty: 30 TABLET | Refills: 1 | Status: SHIPPED | OUTPATIENT
Start: 2022-09-12 | End: 2022-10-18 | Stop reason: SDUPTHER

## 2022-09-12 RX ORDER — METFORMIN HYDROCHLORIDE 500 MG/1
500 TABLET, EXTENDED RELEASE ORAL
Qty: 30 TABLET | Refills: 0 | Status: SHIPPED | OUTPATIENT
Start: 2022-09-12 | End: 2022-10-14 | Stop reason: SDUPTHER

## 2022-09-13 RX ORDER — TESTOSTERONE CYPIONATE 100 MG/ML
INJECTION, SOLUTION INTRAMUSCULAR
Qty: 10 ML | Refills: 2 | Status: SHIPPED | OUTPATIENT
Start: 2022-09-13 | End: 2022-09-29 | Stop reason: SDUPTHER

## 2022-09-13 NOTE — TELEPHONE ENCOUNTER
Patient Pedro is appropriate for refill of testosterone.      He Horton M.D. PGY-3  [facility]  [address] [place] [address]  Office: [phone]    This document has been electronically signed by He Horton MD on September 13, 2022 14:21 CDT

## 2022-09-14 ENCOUNTER — CLINICAL SUPPORT (OUTPATIENT)
Dept: FAMILY MEDICINE CLINIC | Facility: CLINIC | Age: 27
End: 2022-09-14

## 2022-09-14 DIAGNOSIS — E29.1 HYPOGONADISM IN MALE: Primary | ICD-10-CM

## 2022-09-14 PROCEDURE — 96372 THER/PROPH/DIAG INJ SC/IM: CPT | Performed by: STUDENT IN AN ORGANIZED HEALTH CARE EDUCATION/TRAINING PROGRAM

## 2022-09-14 RX ORDER — TESTOSTERONE CYPIONATE 200 MG/ML
100 INJECTION, SOLUTION INTRAMUSCULAR ONCE
Status: COMPLETED | OUTPATIENT
Start: 2022-09-14 | End: 2022-09-14

## 2022-09-14 RX ADMIN — TESTOSTERONE CYPIONATE 100 MG: 200 INJECTION, SOLUTION INTRAMUSCULAR at 09:25

## 2022-09-22 DIAGNOSIS — E29.1 HYPOGONADISM IN MALE: ICD-10-CM

## 2022-09-22 DIAGNOSIS — N52.8 OTHER MALE ERECTILE DYSFUNCTION: ICD-10-CM

## 2022-09-27 DIAGNOSIS — E29.1 HYPOGONADISM IN MALE: ICD-10-CM

## 2022-09-27 DIAGNOSIS — N52.8 OTHER MALE ERECTILE DYSFUNCTION: ICD-10-CM

## 2022-09-27 RX ORDER — TESTOSTERONE CYPIONATE 100 MG/ML
INJECTION, SOLUTION INTRAMUSCULAR
Qty: 10 ML | Refills: 2 | Status: CANCELLED | OUTPATIENT
Start: 2022-09-27

## 2022-09-28 ENCOUNTER — DOCUMENTATION (OUTPATIENT)
Dept: FAMILY MEDICINE CLINIC | Facility: CLINIC | Age: 27
End: 2022-09-28

## 2022-09-28 RX ORDER — TESTOSTERONE CYPIONATE 100 MG/ML
INJECTION, SOLUTION INTRAMUSCULAR
Qty: 10 ML | Refills: 2 | Status: CANCELLED | OUTPATIENT
Start: 2022-09-28

## 2022-09-29 ENCOUNTER — OFFICE VISIT (OUTPATIENT)
Dept: FAMILY MEDICINE CLINIC | Facility: CLINIC | Age: 27
End: 2022-09-29

## 2022-09-29 VITALS — HEIGHT: 68 IN | WEIGHT: 213 LBS | BODY MASS INDEX: 32.28 KG/M2

## 2022-09-29 DIAGNOSIS — N52.8 OTHER MALE ERECTILE DYSFUNCTION: ICD-10-CM

## 2022-09-29 DIAGNOSIS — E29.1 HYPOGONADISM IN MALE: ICD-10-CM

## 2022-09-29 DIAGNOSIS — M79.10 MYALGIA: ICD-10-CM

## 2022-09-29 PROCEDURE — 99443 PR PHYS/QHP TELEPHONE EVALUATION 21-30 MIN: CPT | Performed by: STUDENT IN AN ORGANIZED HEALTH CARE EDUCATION/TRAINING PROGRAM

## 2022-09-29 RX ORDER — TESTOSTERONE CYPIONATE 200 MG/ML
INJECTION, SOLUTION INTRAMUSCULAR
COMMUNITY
Start: 2022-09-12 | End: 2022-09-29

## 2022-09-29 RX ORDER — MELOXICAM 15 MG/1
15 TABLET ORAL DAILY
Qty: 14 TABLET | Refills: 0 | Status: SHIPPED | OUTPATIENT
Start: 2022-09-29 | End: 2022-11-18 | Stop reason: SDUPTHER

## 2022-09-29 RX ORDER — TESTOSTERONE CYPIONATE 200 MG/ML
INJECTION, SOLUTION INTRAMUSCULAR
Status: CANCELLED | OUTPATIENT
Start: 2022-09-29

## 2022-09-29 RX ORDER — TESTOSTERONE CYPIONATE 100 MG/ML
100 INJECTION, SOLUTION INTRAMUSCULAR
Qty: 10 ML | Refills: 0 | Status: SHIPPED | OUTPATIENT
Start: 2022-09-29 | End: 2022-12-16 | Stop reason: SDUPTHER

## 2022-09-29 RX ORDER — LEVOTHYROXINE SODIUM 0.05 MG/1
50 TABLET ORAL EVERY MORNING
COMMUNITY
Start: 2022-09-12 | End: 2022-11-18 | Stop reason: SDUPTHER

## 2022-09-30 NOTE — PROGRESS NOTES
Family Medicine Residency  He Horton MD    Subjective:     Ricki Andrade is a 27 y.o. male who presents for issues obtaining his testosterone.    The patient's pharmacy had provided him a 200mg T vial that was single use.  He gave himself 100mg and could not get the 2nd dose from the vial.  This was not what was written or prescribed.  We had no idea until this issue was found by our clinical pharmacist.  We explained the issue to the patient and apologized.    The following portions of the patient's history were reviewed and updated as appropriate: allergies, current medications, past family history, past medical history, past social history, past surgical history and problem list.    Past Medical Hx:  No past medical history on file.    Past Surgical Hx:  No past surgical history on file.    Current Meds:    Current Outpatient Medications:   •  buPROPion XL (WELLBUTRIN XL) 300 MG 24 hr tablet, TAKE 1 TABLET BY MOUTH EVERY MORNING, Disp: 30 tablet, Rfl: 1  •  DULoxetine (CYMBALTA) 30 MG capsule, TAKE 1 CAPSULE BY MOUTH DAILY, Disp: 30 capsule, Rfl: 1  •  fexofenadine (ALLEGRA) 180 MG tablet, Take 1 tablet by mouth Daily., Disp: 30 tablet, Rfl: 3  •  levothyroxine (Synthroid) 88 MCG tablet, Take 1 tablet by mouth Every Morning., Disp: 30 tablet, Rfl: 2  •  levothyroxine (SYNTHROID, LEVOTHROID) 50 MCG tablet, Take 50 mcg by mouth Every Morning., Disp: , Rfl:   •  meloxicam (MOBIC) 15 MG tablet, Take 1 tablet by mouth Daily., Disp: 14 tablet, Rfl: 0  •  metFORMIN ER (GLUCOPHAGE-XR) 500 MG 24 hr tablet, TAKE 1 TABLET BY MOUTH DAILY WITH BREAKFAST, Disp: 30 tablet, Rfl: 0  •  testosterone cypionate (DEPO-TESTOSTERONE) 100 MG/ML solution injection, Inject 1 mL into the appropriate muscle as directed by prescriber Every 14 (Fourteen) Days., Disp: 10 mL, Rfl: 0    Allergies:  Allergies   Allergen Reactions   • Cephalexin Unknown - High Severity       Family Hx:  No family history on file.     Social  "History:  Social History     Socioeconomic History   • Marital status: Single   Tobacco Use   • Smoking status: Never Smoker   • Smokeless tobacco: Never Used   Substance and Sexual Activity   • Alcohol use: Yes     Alcohol/week: 4.0 standard drinks     Types: 2 Cans of beer, 1 Shots of liquor, 1 Glasses of wine per week   • Drug use: Never       Review of Systems  Review of Systems   Constitutional: Positive for fatigue. Negative for chills and fever.   HENT: Negative for congestion, rhinorrhea and sore throat.    Eyes: Negative for visual disturbance.   Respiratory: Negative for chest tightness and shortness of breath.    Cardiovascular: Negative for chest pain and palpitations.   Gastrointestinal: Negative for abdominal pain, diarrhea, nausea and vomiting.   Endocrine: Negative for polyuria.   Genitourinary: Negative for difficulty urinating and dysuria.   Musculoskeletal: Negative for back pain and myalgias.   Skin: Negative for rash and wound.   Neurological: Negative for dizziness, weakness and numbness.   Hematological: Does not bruise/bleed easily.   Psychiatric/Behavioral: Negative for agitation, behavioral problems and confusion.       Objective:     Ht 172.7 cm (68\")   Wt 96.6 kg (213 lb)   BMI 32.39 kg/m²   Physical Exam  Pulmonary:      Effort: No respiratory distress.   Neurological:      Mental Status: He is alert.   Psychiatric:         Mood and Affect: Mood normal.         Behavior: Behavior normal.          Assessment/Plan:     Diagnoses and all orders for this visit:    1. Myalgia  Patient requesting PRN mobic for infrequent pain.  -     meloxicam (MOBIC) 15 MG tablet; Take 1 tablet by mouth Daily.  Dispense: 14 tablet; Refill: 0    2. Other male erectile dysfunction  Multiple kaspers run in the chart in the last month.  UDS uptodate.  Everything appropriate.  -     testosterone cypionate (DEPO-TESTOSTERONE) 100 MG/ML solution injection; Inject 1 mL into the appropriate muscle as directed by " prescriber Every 14 (Fourteen) Days.  Dispense: 10 mL; Refill: 0    3. Hypogonadism in male  As above.  -     testosterone cypionate (DEPO-TESTOSTERONE) 100 MG/ML solution injection; Inject 1 mL into the appropriate muscle as directed by prescriber Every 14 (Fourteen) Days.  Dispense: 10 mL; Refill: 0             Follow-up:     2 months    Preventative:  Health Maintenance   Topic Date Due   • COVID-19 Vaccine (1) Never done   • ANNUAL PHYSICAL  Never done   • INFLUENZA VACCINE  Never done   • TDAP/TD VACCINES (2 - Tdap) 01/06/2032   • HEPATITIS C SCREENING  Completed   • Pneumococcal Vaccine 0-64  Aged Out       Alcohol use:  reports current alcohol use of about 4.0 standard drinks of alcohol per week.  Nicotine status  reports that he has never smoked. He has never used smokeless tobacco.    RISK SCORE: 1    This was a telephone visit that lasted 30 minutes that the patient consented to.  Patient was contacted at an undisclosed location from our clinic.      This document has been electronically signed by He Horton MD on September 30, 2022 15:19 CDT

## 2022-10-09 DIAGNOSIS — R73.03 PREDIABETES: ICD-10-CM

## 2022-10-14 DIAGNOSIS — R73.03 PREDIABETES: ICD-10-CM

## 2022-10-14 RX ORDER — METFORMIN HYDROCHLORIDE 500 MG/1
500 TABLET, EXTENDED RELEASE ORAL
Qty: 30 TABLET | Refills: 0 | Status: SHIPPED | OUTPATIENT
Start: 2022-10-14 | End: 2022-10-15

## 2022-10-15 RX ORDER — METFORMIN HYDROCHLORIDE 500 MG/1
500 TABLET, EXTENDED RELEASE ORAL
Qty: 30 TABLET | Refills: 0 | Status: SHIPPED | OUTPATIENT
Start: 2022-10-15 | End: 2022-11-14 | Stop reason: SDUPTHER

## 2022-10-17 DIAGNOSIS — J30.2 SEASONAL ALLERGIES: ICD-10-CM

## 2022-10-18 DIAGNOSIS — F32.9 MAJOR DEPRESSIVE DISORDER WITH CURRENT ACTIVE EPISODE, UNSPECIFIED DEPRESSION EPISODE SEVERITY, UNSPECIFIED WHETHER RECURRENT: ICD-10-CM

## 2022-10-18 DIAGNOSIS — N52.8 OTHER MALE ERECTILE DYSFUNCTION: ICD-10-CM

## 2022-10-18 RX ORDER — FEXOFENADINE HCL 180 MG/1
180 TABLET ORAL DAILY
Qty: 30 TABLET | Refills: 3 | Status: SHIPPED | OUTPATIENT
Start: 2022-10-18 | End: 2022-12-16 | Stop reason: SDUPTHER

## 2022-10-18 RX ORDER — DULOXETIN HYDROCHLORIDE 30 MG/1
30 CAPSULE, DELAYED RELEASE ORAL DAILY
Qty: 30 CAPSULE | Refills: 1 | Status: SHIPPED | OUTPATIENT
Start: 2022-10-18 | End: 2022-11-18 | Stop reason: SDUPTHER

## 2022-10-18 RX ORDER — BUPROPION HYDROCHLORIDE 300 MG/1
300 TABLET ORAL EVERY MORNING
Qty: 30 TABLET | Refills: 1 | Status: SHIPPED | OUTPATIENT
Start: 2022-10-18 | End: 2022-11-18 | Stop reason: SDUPTHER

## 2022-11-14 DIAGNOSIS — R73.03 PREDIABETES: ICD-10-CM

## 2022-11-14 RX ORDER — METFORMIN HYDROCHLORIDE 500 MG/1
500 TABLET, EXTENDED RELEASE ORAL
Qty: 30 TABLET | Refills: 0 | Status: SHIPPED | OUTPATIENT
Start: 2022-11-14 | End: 2022-12-12

## 2022-11-17 ENCOUNTER — TELEPHONE (OUTPATIENT)
Dept: FAMILY MEDICINE CLINIC | Facility: CLINIC | Age: 27
End: 2022-11-17

## 2022-11-17 NOTE — TELEPHONE ENCOUNTER
Incoming Refill Request      Medication requested (name and dose):   testosterone cypionate (DEPO-TESTOSTERONE) 100 MG/ML solution injection  levothyroxine (SYNTHROID, LEVOTHROID) 50 MCG tablet  meloxicam (MOBIC) 15 MG tablet  fexofenadine (ALLEGRA) 180 MG tablet  buPROPion XL (WELLBUTRIN XL) 300 MG 24 hr tablet  Pharmacy where request should be sent:   [facility] FOR EVERYTHING BUT TESTOSTERONE IT NEEDS TO GOT TO [facility]  Additional details provided by patient:   TESTOSTERONE GOES TO [facility]  Best call back number:   [phone]    Does the patient have less than a 3 day supply:  [x] Yes  [] No    Reema Lopez Rep  11/17/22, 16:08 CST

## 2022-11-18 DIAGNOSIS — N52.8 OTHER MALE ERECTILE DYSFUNCTION: ICD-10-CM

## 2022-11-18 DIAGNOSIS — M79.10 MYALGIA: ICD-10-CM

## 2022-11-18 DIAGNOSIS — F32.9 MAJOR DEPRESSIVE DISORDER WITH CURRENT ACTIVE EPISODE, UNSPECIFIED DEPRESSION EPISODE SEVERITY, UNSPECIFIED WHETHER RECURRENT: ICD-10-CM

## 2022-11-18 DIAGNOSIS — E29.1 HYPOGONADISM IN MALE: ICD-10-CM

## 2022-11-18 RX ORDER — BUPROPION HYDROCHLORIDE 300 MG/1
300 TABLET ORAL EVERY MORNING
Qty: 30 TABLET | Refills: 1 | Status: SHIPPED | OUTPATIENT
Start: 2022-11-18 | End: 2023-02-28 | Stop reason: SDUPTHER

## 2022-11-18 RX ORDER — DULOXETIN HYDROCHLORIDE 30 MG/1
30 CAPSULE, DELAYED RELEASE ORAL DAILY
Qty: 30 CAPSULE | Refills: 1 | Status: SHIPPED | OUTPATIENT
Start: 2022-11-18 | End: 2023-02-28 | Stop reason: SDUPTHER

## 2022-11-18 RX ORDER — MELOXICAM 15 MG/1
15 TABLET ORAL DAILY
Qty: 14 TABLET | Refills: 0 | Status: SHIPPED | OUTPATIENT
Start: 2022-11-18 | End: 2023-01-06 | Stop reason: SDUPTHER

## 2022-11-18 RX ORDER — TESTOSTERONE CYPIONATE 100 MG/ML
100 INJECTION, SOLUTION INTRAMUSCULAR
Qty: 10 ML | Refills: 0 | OUTPATIENT
Start: 2022-11-18

## 2022-11-18 RX ORDER — LEVOTHYROXINE SODIUM 0.05 MG/1
50 TABLET ORAL EVERY MORNING
Qty: 30 TABLET | Refills: 0 | Status: SHIPPED | OUTPATIENT
Start: 2022-11-18 | End: 2023-01-06 | Stop reason: SDUPTHER

## 2022-12-11 DIAGNOSIS — R73.03 PREDIABETES: ICD-10-CM

## 2022-12-12 RX ORDER — METFORMIN HYDROCHLORIDE 500 MG/1
500 TABLET, EXTENDED RELEASE ORAL
Qty: 30 TABLET | Refills: 0 | Status: SHIPPED | OUTPATIENT
Start: 2022-12-12 | End: 2023-01-12

## 2022-12-16 DIAGNOSIS — N52.8 OTHER MALE ERECTILE DYSFUNCTION: ICD-10-CM

## 2022-12-16 DIAGNOSIS — E29.1 HYPOGONADISM IN MALE: ICD-10-CM

## 2022-12-16 DIAGNOSIS — J30.2 SEASONAL ALLERGIES: ICD-10-CM

## 2022-12-16 RX ORDER — FEXOFENADINE HCL 180 MG/1
180 TABLET ORAL DAILY
Qty: 30 TABLET | Refills: 3 | Status: SHIPPED | OUTPATIENT
Start: 2022-12-16 | End: 2023-02-28 | Stop reason: SDUPTHER

## 2022-12-19 ENCOUNTER — DOCUMENTATION (OUTPATIENT)
Dept: FAMILY MEDICINE CLINIC | Facility: CLINIC | Age: 27
End: 2022-12-19

## 2022-12-20 RX ORDER — TESTOSTERONE CYPIONATE 100 MG/ML
100 INJECTION, SOLUTION INTRAMUSCULAR
Qty: 10 ML | Refills: 2 | Status: SHIPPED | OUTPATIENT
Start: 2022-12-20 | End: 2023-02-22 | Stop reason: SDUPTHER

## 2022-12-20 NOTE — PROGRESS NOTES
Patient is requiring early refill of his testosterone because there is a backorder of the particular vial I would like to provide the patient.  He is currently getting single dose vials as a substitute.  Pedro is appropriate.          He Horton M.D. PGY-3  Chief Resident  [facility]  [address] [place], [place] [address]  Office: [phone]    This document has been electronically signed by He Horton MD on December 19, 2022 20:17 CST

## 2023-01-06 ENCOUNTER — OFFICE VISIT (OUTPATIENT)
Dept: FAMILY MEDICINE CLINIC | Facility: CLINIC | Age: 28
End: 2023-01-06
Payer: MEDICAID

## 2023-01-06 ENCOUNTER — LAB (OUTPATIENT)
Dept: LAB | Facility: HOSPITAL | Age: 28
End: 2023-01-06
Payer: MEDICAID

## 2023-01-06 VITALS
SYSTOLIC BLOOD PRESSURE: 120 MMHG | OXYGEN SATURATION: 95 % | BODY MASS INDEX: 31.31 KG/M2 | HEART RATE: 120 BPM | HEIGHT: 68 IN | WEIGHT: 206.6 LBS | DIASTOLIC BLOOD PRESSURE: 72 MMHG

## 2023-01-06 DIAGNOSIS — E29.1 HYPOGONADISM IN MALE: ICD-10-CM

## 2023-01-06 DIAGNOSIS — M79.10 MYALGIA: ICD-10-CM

## 2023-01-06 DIAGNOSIS — E29.1 HYPOGONADISM IN MALE: Primary | ICD-10-CM

## 2023-01-06 DIAGNOSIS — Z12.5 SPECIAL SCREENING FOR MALIGNANT NEOPLASM OF PROSTATE: ICD-10-CM

## 2023-01-06 PROCEDURE — 36415 COLL VENOUS BLD VENIPUNCTURE: CPT

## 2023-01-06 PROCEDURE — 99213 OFFICE O/P EST LOW 20 MIN: CPT | Performed by: STUDENT IN AN ORGANIZED HEALTH CARE EDUCATION/TRAINING PROGRAM

## 2023-01-06 PROCEDURE — 84403 ASSAY OF TOTAL TESTOSTERONE: CPT

## 2023-01-06 PROCEDURE — 84402 ASSAY OF FREE TESTOSTERONE: CPT

## 2023-01-06 PROCEDURE — G0103 PSA SCREENING: HCPCS

## 2023-01-06 RX ORDER — TESTOSTERONE CYPIONATE 200 MG/ML
INJECTION, SOLUTION INTRAMUSCULAR
COMMUNITY
Start: 2022-11-22

## 2023-01-06 RX ORDER — MELOXICAM 15 MG/1
15 TABLET ORAL DAILY
Qty: 14 TABLET | Refills: 0 | Status: SHIPPED | OUTPATIENT
Start: 2023-01-06 | End: 2023-01-06 | Stop reason: SDUPTHER

## 2023-01-06 RX ORDER — MELOXICAM 15 MG/1
15 TABLET ORAL DAILY PRN
Qty: 30 TABLET | Refills: 0 | Status: SHIPPED | OUTPATIENT
Start: 2023-01-06 | End: 2023-02-28 | Stop reason: SDUPTHER

## 2023-01-06 RX ORDER — LEVOTHYROXINE SODIUM 0.05 MG/1
50 TABLET ORAL EVERY MORNING
Qty: 30 TABLET | Refills: 0 | Status: SHIPPED | OUTPATIENT
Start: 2023-01-06 | End: 2023-02-28 | Stop reason: SDUPTHER

## 2023-01-06 NOTE — PROGRESS NOTES
Family Medicine Residency  He Horton MD    Subjective:     Ricki Andrade is a 27 y.o. male who presents for hypogonadism as well as MSK pain.    Patient states he has been doing ok on his testosterone replacement and has been able to get his vials from the pharmacy without much issue.  Not using mobic every day, but sometimes will use occasionally if joints hurt from work.  Patient was warned not to use regularly due to GI issues.    The following portions of the patient's history were reviewed and updated as appropriate: allergies, current medications, past family history, past medical history, past social history, past surgical history and problem list.    Past Medical Hx:  History reviewed. No pertinent past medical history.    Past Surgical Hx:  History reviewed. No pertinent surgical history.    Current Meds:    Current Outpatient Medications:   •  buPROPion XL (WELLBUTRIN XL) 300 MG 24 hr tablet, Take 1 tablet by mouth Every Morning., Disp: 30 tablet, Rfl: 1  •  DULoxetine (CYMBALTA) 30 MG capsule, Take 1 capsule by mouth Daily., Disp: 30 capsule, Rfl: 1  •  fexofenadine (ALLEGRA) 180 MG tablet, Take 1 tablet by mouth Daily., Disp: 30 tablet, Rfl: 3  •  levothyroxine (SYNTHROID, LEVOTHROID) 50 MCG tablet, Take 1 tablet by mouth Every Morning., Disp: 30 tablet, Rfl: 0  •  meloxicam (MOBIC) 15 MG tablet, Take 1 tablet by mouth Daily As Needed for Mild Pain or Moderate Pain., Disp: 30 tablet, Rfl: 0  •  metFORMIN ER (GLUCOPHAGE-XR) 500 MG 24 hr tablet, TAKE 1 TABLET BY MOUTH DAILY WITH BREAKFAST, Disp: 30 tablet, Rfl: 0  •  Testosterone Cypionate (DEPOTESTOTERONE CYPIONATE) 200 MG/ML injection, , Disp: , Rfl:   •  testosterone cypionate (DEPO-TESTOSTERONE) 100 MG/ML solution injection, Inject 1 mL into the appropriate muscle as directed by prescriber Every 14 (Fourteen) Days., Disp: 10 mL, Rfl: 2    Allergies:  Allergies   Allergen Reactions   • Cephalexin Unknown - High Severity       Family  Hx:  History reviewed. No pertinent family history.     Social History:  Social History     Socioeconomic History   • Marital status: Single   Tobacco Use   • Smoking status: Never   • Smokeless tobacco: Never   Substance and Sexual Activity   • Alcohol use: Yes     Alcohol/week: 4.0 standard drinks     Types: 2 Cans of beer, 1 Shots of liquor, 1 Glasses of wine per week   • Drug use: Never       Review of Systems  Review of Systems   Constitutional: Negative for chills and fever.   HENT: Negative for congestion, rhinorrhea and sore throat.    Eyes: Negative for visual disturbance.   Respiratory: Negative for chest tightness and shortness of breath.    Cardiovascular: Negative for chest pain and palpitations.   Gastrointestinal: Negative for abdominal pain, diarrhea, nausea and vomiting.   Endocrine: Negative for polyuria.   Genitourinary: Negative for difficulty urinating and dysuria.   Musculoskeletal: Positive for arthralgias. Negative for back pain and myalgias.   Skin: Negative for rash and wound.   Neurological: Negative for dizziness, weakness and numbness.   Hematological: Does not bruise/bleed easily.   Psychiatric/Behavioral: Negative for agitation, behavioral problems and confusion.       Objective:     /72   Pulse 120   Ht 172.7 cm (68\")   Wt 93.7 kg (206 lb 9.6 oz)   SpO2 95%   BMI 31.41 kg/m²   Physical Exam  Constitutional:       General: He is not in acute distress.  HENT:      Head: Normocephalic and atraumatic.      Right Ear: External ear normal.      Left Ear: External ear normal.   Cardiovascular:      Rate and Rhythm: Normal rate and regular rhythm.      Heart sounds: Normal heart sounds.   Pulmonary:      Effort: Pulmonary effort is normal. No respiratory distress.      Breath sounds: Normal breath sounds.   Abdominal:      General: Bowel sounds are normal.      Palpations: Abdomen is soft.      Tenderness: There is no abdominal tenderness.   Musculoskeletal:      Right lower leg:  No edema.      Left lower leg: No edema.   Skin:     General: Skin is warm and dry.   Neurological:      Mental Status: He is alert.      Comments: Moving all extremities   Psychiatric:         Mood and Affect: Mood normal.         Behavior: Behavior normal.          Assessment/Plan:     Diagnoses and all orders for this visit:    1. Hypogonadism in male (Primary)  Patient doing well on therapy.  However, will need labs to confirm dosing and to screen for prostate cancer.  -     Testosterone (Free & Total), LC / MS; Future  -     PSA SCREENING; Future    2. Myalgia  Stable.  Ok with PRN mobic as patient is young.  -     meloxicam (MOBIC) 15 MG tablet; Take 1 tablet by mouth Daily As Needed for Mild Pain or Moderate Pain.  Dispense: 30 tablet; Refill: 0    3. Special screening for malignant neoplasm of prostate  As above.  -     PSA SCREENING; Future             Follow-up:     3 months    Preventative:  Health Maintenance   Topic Date Due   • COVID-19 Vaccine (1) Never done   • ANNUAL PHYSICAL  Never done   • INFLUENZA VACCINE  Never done   • TDAP/TD VACCINES (2 - Tdap) 01/06/2032   • HEPATITIS C SCREENING  Completed   • Pneumococcal Vaccine 0-64  Aged Out       Alcohol use:  reports current alcohol use of about 4.0 standard drinks per week.  Nicotine status  reports that he has never smoked. He has never used smokeless tobacco.    RISK SCORE: 1      This document has been electronically signed by He Horton MD on January 6, 2023 17:05 CST

## 2023-01-07 LAB — PSA SERPL-MCNC: 0.77 NG/ML (ref 0–4)

## 2023-01-12 DIAGNOSIS — R73.03 PREDIABETES: ICD-10-CM

## 2023-01-12 LAB
TESTOST FREE SERPL-MCNC: 22.4 PG/ML (ref 9.3–26.5)
TESTOST SERPL-MCNC: 228.5 NG/DL (ref 264–916)

## 2023-01-12 RX ORDER — METFORMIN HYDROCHLORIDE 500 MG/1
500 TABLET, EXTENDED RELEASE ORAL
Qty: 30 TABLET | Refills: 0 | Status: SHIPPED | OUTPATIENT
Start: 2023-01-12 | End: 2023-02-28 | Stop reason: SDUPTHER

## 2023-01-12 NOTE — PROGRESS NOTES
I have reviewed the notes, assessments, and/or procedures performed by me, I concur with her/his documentation of Ricki Andrade.

## 2023-02-22 DIAGNOSIS — E29.1 HYPOGONADISM IN MALE: ICD-10-CM

## 2023-02-22 DIAGNOSIS — N52.8 OTHER MALE ERECTILE DYSFUNCTION: ICD-10-CM

## 2023-02-23 RX ORDER — TESTOSTERONE CYPIONATE 100 MG/ML
100 INJECTION, SOLUTION INTRAMUSCULAR
Qty: 10 ML | Refills: 2 | Status: SHIPPED | OUTPATIENT
Start: 2023-02-23

## 2023-02-27 DIAGNOSIS — F32.9 MAJOR DEPRESSIVE DISORDER WITH CURRENT ACTIVE EPISODE, UNSPECIFIED DEPRESSION EPISODE SEVERITY, UNSPECIFIED WHETHER RECURRENT: ICD-10-CM

## 2023-02-27 DIAGNOSIS — J30.2 SEASONAL ALLERGIES: ICD-10-CM

## 2023-02-27 DIAGNOSIS — M79.10 MYALGIA: ICD-10-CM

## 2023-02-27 DIAGNOSIS — E29.1 HYPOGONADISM IN MALE: ICD-10-CM

## 2023-02-27 DIAGNOSIS — N52.8 OTHER MALE ERECTILE DYSFUNCTION: ICD-10-CM

## 2023-02-27 DIAGNOSIS — R73.03 PREDIABETES: ICD-10-CM

## 2023-02-28 RX ORDER — MELOXICAM 15 MG/1
15 TABLET ORAL DAILY PRN
Qty: 30 TABLET | Refills: 0 | Status: SHIPPED | OUTPATIENT
Start: 2023-02-28

## 2023-02-28 RX ORDER — FEXOFENADINE HCL 180 MG/1
180 TABLET ORAL DAILY
Qty: 30 TABLET | Refills: 3 | Status: SHIPPED | OUTPATIENT
Start: 2023-02-28

## 2023-02-28 RX ORDER — BUPROPION HYDROCHLORIDE 300 MG/1
300 TABLET ORAL EVERY MORNING
Qty: 30 TABLET | Refills: 1 | Status: SHIPPED | OUTPATIENT
Start: 2023-02-28

## 2023-02-28 RX ORDER — TESTOSTERONE CYPIONATE 100 MG/ML
100 INJECTION, SOLUTION INTRAMUSCULAR
Qty: 10 ML | Refills: 2 | OUTPATIENT
Start: 2023-02-28

## 2023-02-28 RX ORDER — LEVOTHYROXINE SODIUM 0.05 MG/1
50 TABLET ORAL EVERY MORNING
Qty: 30 TABLET | Refills: 0 | Status: SHIPPED | OUTPATIENT
Start: 2023-02-28

## 2023-02-28 RX ORDER — TESTOSTERONE CYPIONATE 200 MG/ML
INJECTION, SOLUTION INTRAMUSCULAR
OUTPATIENT
Start: 2023-02-28

## 2023-02-28 RX ORDER — DULOXETIN HYDROCHLORIDE 30 MG/1
30 CAPSULE, DELAYED RELEASE ORAL DAILY
Qty: 30 CAPSULE | Refills: 1 | Status: SHIPPED | OUTPATIENT
Start: 2023-02-28

## 2023-02-28 RX ORDER — METFORMIN HYDROCHLORIDE 500 MG/1
500 TABLET, EXTENDED RELEASE ORAL
Qty: 30 TABLET | Refills: 0 | Status: SHIPPED | OUTPATIENT
Start: 2023-02-28

## 2023-04-13 DIAGNOSIS — E03.8 OTHER SPECIFIED HYPOTHYROIDISM: ICD-10-CM

## 2023-04-13 RX ORDER — LEVOTHYROXINE SODIUM 0.05 MG/1
50 TABLET ORAL EVERY MORNING
Qty: 90 TABLET | Refills: 0 | Status: SHIPPED | OUTPATIENT
Start: 2023-04-13

## 2023-04-13 RX ORDER — LEVOTHYROXINE SODIUM 88 UG/1
88 TABLET ORAL
Qty: 90 TABLET | OUTPATIENT
Start: 2023-04-13

## 2023-04-27 ENCOUNTER — OFFICE VISIT (OUTPATIENT)
Dept: FAMILY MEDICINE CLINIC | Facility: CLINIC | Age: 28
End: 2023-04-27
Payer: MEDICAID

## 2023-04-27 VITALS
WEIGHT: 209.8 LBS | HEART RATE: 99 BPM | TEMPERATURE: 96.8 F | DIASTOLIC BLOOD PRESSURE: 78 MMHG | HEIGHT: 69 IN | SYSTOLIC BLOOD PRESSURE: 132 MMHG | OXYGEN SATURATION: 98 % | BODY MASS INDEX: 31.07 KG/M2

## 2023-04-27 DIAGNOSIS — E29.1 HYPOGONADISM IN MALE: Primary | ICD-10-CM

## 2023-04-27 RX ORDER — TESTOSTERONE CYPIONATE 200 MG/ML
200 INJECTION, SOLUTION INTRAMUSCULAR
Qty: 4 ML | Refills: 1 | Status: SHIPPED | OUTPATIENT
Start: 2023-04-27

## 2023-04-27 NOTE — LETTER
April 27, 2023     Patient: Ricki Andrade   YOB: 1995   Date of Visit: 4/27/2023       To Whom It May Concern:    It is my medical opinion that Ricki Andrade should not participate in jury duty due to the inbaility to sit for prolonged periods of time due to clotting risk due to therapy.         Sincerely,      He Horton M.D. PGY-3  Chief Resident  [facility]  [address] [place] [address]  Office: [phone]

## 2023-04-29 NOTE — PROGRESS NOTES
Family Medicine Residency  He Horton MD    Subjective:     Ricki Andrade is a 27 y.o. male who presents for management of his hypogonadism.    Patient states he is doing well at this time.  He has a jury duty summons.    The following portions of the patient's history were reviewed and updated as appropriate: allergies, current medications, past family history, past medical history, past social history, past surgical history and problem list.    Past Medical Hx:  History reviewed. No pertinent past medical history.    Past Surgical Hx:  No past surgical history on file.    Current Meds:    Current Outpatient Medications:   •  buPROPion XL (WELLBUTRIN XL) 300 MG 24 hr tablet, Take 1 tablet by mouth Every Morning., Disp: 30 tablet, Rfl: 1  •  DULoxetine (CYMBALTA) 30 MG capsule, Take 1 capsule by mouth Daily., Disp: 30 capsule, Rfl: 1  •  fexofenadine (ALLEGRA) 180 MG tablet, Take 1 tablet by mouth Daily., Disp: 30 tablet, Rfl: 3  •  levothyroxine (SYNTHROID, LEVOTHROID) 50 MCG tablet, TAKE 1 TABLET BY MOUTH EVERY MORNING, Disp: 90 tablet, Rfl: 0  •  meloxicam (MOBIC) 15 MG tablet, Take 1 tablet by mouth Daily As Needed for Mild Pain or Moderate Pain., Disp: 30 tablet, Rfl: 0  •  metFORMIN ER (GLUCOPHAGE-XR) 500 MG 24 hr tablet, Take 1 tablet by mouth Daily With Breakfast., Disp: 30 tablet, Rfl: 0  •  Testosterone Cypionate (DEPOTESTOTERONE CYPIONATE) 200 MG/ML injection, Inject 1 mL into the appropriate muscle as directed by prescriber Every 14 (Fourteen) Days., Disp: 4 mL, Rfl: 1    Allergies:  Allergies   Allergen Reactions   • Cephalexin Unknown - High Severity       Family Hx:  History reviewed. No pertinent family history.     Social History:  Social History     Socioeconomic History   • Marital status: Single   Tobacco Use   • Smoking status: Never   • Smokeless tobacco: Never   Substance and Sexual Activity   • Alcohol use: Yes     Alcohol/week: 4.0 standard drinks     Types: 2 Cans of beer,  "1 Shots of liquor, 1 Glasses of wine per week   • Drug use: Never       Review of Systems  Review of Systems   Constitutional: Negative for chills and fever.   HENT: Negative for congestion, rhinorrhea and sore throat.    Eyes: Negative for visual disturbance.   Respiratory: Negative for chest tightness and shortness of breath.    Cardiovascular: Negative for chest pain and palpitations.   Gastrointestinal: Negative for abdominal pain, diarrhea, nausea and vomiting.   Endocrine: Negative for polyuria.   Genitourinary: Negative for difficulty urinating and dysuria.   Musculoskeletal: Negative for back pain and myalgias.   Skin: Negative for rash and wound.   Neurological: Negative for dizziness, weakness and numbness.   Hematological: Does not bruise/bleed easily.   Psychiatric/Behavioral: Negative for agitation, behavioral problems and confusion.       Objective:     /78   Pulse 99   Temp 96.8 °F (36 °C)   Ht 175.3 cm (69\")   Wt 95.2 kg (209 lb 12.8 oz)   SpO2 98%   BMI 30.98 kg/m²   Physical Exam  Constitutional:       General: He is not in acute distress.  HENT:      Head: Normocephalic and atraumatic.      Right Ear: External ear normal.      Left Ear: External ear normal.      Nose: Nose normal.   Eyes:      Extraocular Movements: Extraocular movements intact.   Pulmonary:      Effort: No respiratory distress.   Musculoskeletal:         General: No deformity.   Skin:     General: Skin is warm and dry.   Neurological:      General: No focal deficit present.      Mental Status: He is alert.   Psychiatric:         Mood and Affect: Mood normal.         Behavior: Behavior normal.              Assessment/Plan:     Diagnoses and all orders for this visit:    1. Hypogonadism in male (Primary)  Patient doing well on therapy.  PDMP appropriate.  UDS uptodate.  No other acute issues.  Wrote a letter for jury duty.  Patient has had issues with fills on his testosterone.  May have to write new scripts if pharmacy " continues to have stock issues.  -     Testosterone Cypionate (DEPOTESTOTERONE CYPIONATE) 200 MG/ML injection; Inject 1 mL into the appropriate muscle as directed by prescriber Every 14 (Fourteen) Days.  Dispense: 4 mL; Refill: 1             Follow-up:     3 months    Preventative:  Health Maintenance   Topic Date Due   • COVID-19 Vaccine (1) Never done   • ANNUAL PHYSICAL  Never done   • INFLUENZA VACCINE  08/01/2023   • TDAP/TD VACCINES (2 - Tdap) 01/06/2032   • HEPATITIS C SCREENING  Completed   • Pneumococcal Vaccine 0-64  Aged Out       Alcohol use:  reports current alcohol use of about 4.0 standard drinks per week.  Nicotine status  reports that he has never smoked. He has never used smokeless tobacco.    RISK SCORE: 1      This document has been electronically signed by He Horton MD on April 28, 2023 23:20 CDT

## 2023-05-09 DIAGNOSIS — R73.03 PREDIABETES: ICD-10-CM

## 2023-05-09 RX ORDER — METFORMIN HYDROCHLORIDE 500 MG/1
500 TABLET, EXTENDED RELEASE ORAL
Qty: 30 TABLET | Refills: 0 | Status: SHIPPED | OUTPATIENT
Start: 2023-05-09

## 2023-06-05 ENCOUNTER — TELEPHONE (OUTPATIENT)
Dept: FAMILY MEDICINE CLINIC | Facility: CLINIC | Age: 28
End: 2023-06-05
Payer: MEDICAID

## 2023-06-05 DIAGNOSIS — N52.8 OTHER MALE ERECTILE DYSFUNCTION: ICD-10-CM

## 2023-06-05 DIAGNOSIS — F32.9 MAJOR DEPRESSIVE DISORDER WITH CURRENT ACTIVE EPISODE, UNSPECIFIED DEPRESSION EPISODE SEVERITY, UNSPECIFIED WHETHER RECURRENT: ICD-10-CM

## 2023-06-05 RX ORDER — BUPROPION HYDROCHLORIDE 300 MG/1
300 TABLET ORAL EVERY MORNING
Qty: 30 TABLET | Refills: 1 | Status: SHIPPED | OUTPATIENT
Start: 2023-06-05

## 2023-06-05 NOTE — TELEPHONE ENCOUNTER
Incoming Refill Request      Medication requested (name and dose): buPROPion XL (WELLBUTRIN XL) 300 MG 24 hr tablet     Pharmacy where request should be sent: [facility]    Additional details provided by patient: PATIENT REQUESTED ALL HIS MEDICATIONS HAVE A REFILL SENT IN SO THEY WOULD BE THERE WHEN HE NEEDS THEM    Best call back number: [phone]    Does the patient have less than a 3 day supply:  [] Yes  [x] No    Alison Majano  06/05/23, 09:25 CDT

## 2023-06-11 DIAGNOSIS — R73.03 PREDIABETES: ICD-10-CM

## 2023-06-12 RX ORDER — METFORMIN HYDROCHLORIDE 500 MG/1
500 TABLET, EXTENDED RELEASE ORAL
Qty: 30 TABLET | Refills: 0 | Status: SHIPPED | OUTPATIENT
Start: 2023-06-12

## 2023-07-21 ENCOUNTER — OFFICE VISIT (OUTPATIENT)
Dept: FAMILY MEDICINE CLINIC | Facility: CLINIC | Age: 28
End: 2023-07-21
Payer: COMMERCIAL

## 2023-07-21 ENCOUNTER — LAB (OUTPATIENT)
Dept: LAB | Facility: HOSPITAL | Age: 28
End: 2023-07-21
Payer: COMMERCIAL

## 2023-07-21 VITALS
HEART RATE: 88 BPM | SYSTOLIC BLOOD PRESSURE: 122 MMHG | OXYGEN SATURATION: 98 % | TEMPERATURE: 97.6 F | WEIGHT: 211.5 LBS | BODY MASS INDEX: 31.32 KG/M2 | HEIGHT: 69 IN | DIASTOLIC BLOOD PRESSURE: 78 MMHG

## 2023-07-21 DIAGNOSIS — Z51.81 THERAPEUTIC DRUG MONITORING: ICD-10-CM

## 2023-07-21 DIAGNOSIS — R73.03 PREDIABETES: ICD-10-CM

## 2023-07-21 DIAGNOSIS — M79.10 MYALGIA: ICD-10-CM

## 2023-07-21 DIAGNOSIS — E29.1 HYPOGONADISM IN MALE: Primary | ICD-10-CM

## 2023-07-21 DIAGNOSIS — E29.1 HYPOGONADISM IN MALE: ICD-10-CM

## 2023-07-21 LAB
AMPHET+METHAMPHET UR QL: NEGATIVE
AMPHETAMINES UR QL: NEGATIVE
BARBITURATES UR QL SCN: NEGATIVE
BASOPHILS # BLD AUTO: 0.08 10*3/MM3 (ref 0–0.2)
BASOPHILS NFR BLD AUTO: 0.9 % (ref 0–1.5)
BENZODIAZ UR QL SCN: NEGATIVE
BUPRENORPHINE SERPL-MCNC: NEGATIVE NG/ML
CANNABINOIDS SERPL QL: NEGATIVE
COCAINE UR QL: NEGATIVE
DEPRECATED RDW RBC AUTO: 38 FL (ref 37–54)
EOSINOPHIL # BLD AUTO: 0.3 10*3/MM3 (ref 0–0.4)
EOSINOPHIL NFR BLD AUTO: 3.4 % (ref 0.3–6.2)
ERYTHROCYTE [DISTWIDTH] IN BLOOD BY AUTOMATED COUNT: 12.6 % (ref 12.3–15.4)
FENTANYL UR-MCNC: NEGATIVE NG/ML
HCT VFR BLD AUTO: 42.4 % (ref 37.5–51)
HGB BLD-MCNC: 14.4 G/DL (ref 13–17.7)
IMM GRANULOCYTES # BLD AUTO: 0.02 10*3/MM3 (ref 0–0.05)
IMM GRANULOCYTES NFR BLD AUTO: 0.2 % (ref 0–0.5)
LYMPHOCYTES # BLD AUTO: 3.47 10*3/MM3 (ref 0.7–3.1)
LYMPHOCYTES NFR BLD AUTO: 39.7 % (ref 19.6–45.3)
MCH RBC QN AUTO: 28.9 PG (ref 26.6–33)
MCHC RBC AUTO-ENTMCNC: 34 G/DL (ref 31.5–35.7)
MCV RBC AUTO: 85 FL (ref 79–97)
METHADONE UR QL SCN: NEGATIVE
MONOCYTES # BLD AUTO: 0.84 10*3/MM3 (ref 0.1–0.9)
MONOCYTES NFR BLD AUTO: 9.6 % (ref 5–12)
NEUTROPHILS NFR BLD AUTO: 4.04 10*3/MM3 (ref 1.7–7)
NEUTROPHILS NFR BLD AUTO: 46.2 % (ref 42.7–76)
NRBC BLD AUTO-RTO: 0 /100 WBC (ref 0–0.2)
OPIATES UR QL: NEGATIVE
OXYCODONE UR QL SCN: NEGATIVE
PCP UR QL SCN: NEGATIVE
PLATELET # BLD AUTO: 226 10*3/MM3 (ref 140–450)
PMV BLD AUTO: 12.4 FL (ref 6–12)
PROLACTIN SERPL-MCNC: 11.7 NG/ML (ref 4.04–15.2)
PROPOXYPH UR QL: NEGATIVE
RBC # BLD AUTO: 4.99 10*6/MM3 (ref 4.14–5.8)
TRICYCLICS UR QL SCN: NEGATIVE
WBC NRBC COR # BLD: 8.75 10*3/MM3 (ref 3.4–10.8)

## 2023-07-21 PROCEDURE — 80307 DRUG TEST PRSMV CHEM ANLYZR: CPT

## 2023-07-21 PROCEDURE — 84146 ASSAY OF PROLACTIN: CPT

## 2023-07-21 PROCEDURE — 36415 COLL VENOUS BLD VENIPUNCTURE: CPT

## 2023-07-21 PROCEDURE — 85025 COMPLETE CBC W/AUTO DIFF WBC: CPT

## 2023-07-21 RX ORDER — METFORMIN HYDROCHLORIDE 500 MG/1
500 TABLET, EXTENDED RELEASE ORAL
Qty: 30 TABLET | Refills: 0 | Status: SHIPPED | OUTPATIENT
Start: 2023-07-21

## 2023-07-21 RX ORDER — TESTOSTERONE CYPIONATE 200 MG/ML
200 INJECTION, SOLUTION INTRAMUSCULAR
Qty: 1 ML | Refills: 0 | Status: SHIPPED | OUTPATIENT
Start: 2023-07-21

## 2023-07-21 RX ORDER — MELOXICAM 15 MG/1
15 TABLET ORAL DAILY PRN
Qty: 30 TABLET | Refills: 0 | Status: SHIPPED | OUTPATIENT
Start: 2023-07-21

## 2023-07-21 NOTE — PROGRESS NOTES
Family Medicine Residency  Dorina Zamora MD    Subjective:     Ricki Andrade is a 28 y.o. male who presents for testosterone re check and medication refill. He is on testosterone for hypogonadism. He reports his symptoms are well controlled on testosterone. He takes it every 14 days. Last dose was 3-4 days ago.         The following portions of the patient's history were reviewed and updated as appropriate: allergies, current medications, past family history, past medical history, past social history, past surgical history, and problem list.    Past Medical Hx:  History reviewed. No pertinent past medical history.    Past Surgical Hx:  History reviewed. No pertinent surgical history.    Current Meds:    Current Outpatient Medications:     buPROPion XL (WELLBUTRIN XL) 300 MG 24 hr tablet, Take 1 tablet by mouth Every Morning., Disp: 30 tablet, Rfl: 1    DULoxetine (CYMBALTA) 30 MG capsule, Take 1 capsule by mouth Daily., Disp: 30 capsule, Rfl: 1    levothyroxine (SYNTHROID, LEVOTHROID) 50 MCG tablet, Take 1 tablet by mouth Every Morning., Disp: 90 tablet, Rfl: 0    meloxicam (MOBIC) 15 MG tablet, Take 1 tablet by mouth Daily As Needed for Mild Pain or Moderate Pain., Disp: 30 tablet, Rfl: 0    metFORMIN ER (GLUCOPHAGE-XR) 500 MG 24 hr tablet, Take 1 tablet by mouth Daily With Breakfast., Disp: 30 tablet, Rfl: 0    Testosterone Cypionate (DEPOTESTOTERONE CYPIONATE) 200 MG/ML injection, Inject 1 mL into the appropriate muscle as directed by prescriber Every 14 (Fourteen) Days., Disp: 1 mL, Rfl: 0    fexofenadine (ALLEGRA) 180 MG tablet, Take 1 tablet by mouth Daily., Disp: 30 tablet, Rfl: 3    Allergies:  Allergies   Allergen Reactions    Cephalexin Unknown - High Severity       Family Hx:  History reviewed. No pertinent family history.     Social History:  Social History     Socioeconomic History    Marital status: Single   Tobacco Use    Smoking status: Never    Smokeless tobacco: Never   Substance  "and Sexual Activity    Alcohol use: Yes     Alcohol/week: 4.0 standard drinks     Types: 2 Cans of beer, 1 Shots of liquor, 1 Glasses of wine per week    Drug use: Never       Review of Systems  Review of Systems   Constitutional:  Negative for appetite change, fatigue and fever.   HENT:  Negative for ear pain, sneezing, sore throat and trouble swallowing.    Respiratory:  Negative for cough, chest tightness and shortness of breath.    Cardiovascular:  Negative for chest pain.   Gastrointestinal:  Negative for abdominal pain, constipation, diarrhea, nausea and vomiting.   Genitourinary:  Negative for difficulty urinating.   Musculoskeletal:  Negative for back pain and myalgias.   Neurological:  Negative for headaches.     Objective:     /78   Pulse 88   Temp 97.6 °F (36.4 °C)   Ht 175.3 cm (69\")   Wt 95.9 kg (211 lb 8 oz)   SpO2 98%   BMI 31.23 kg/m²   Physical Exam  Vitals reviewed.   Constitutional:       Appearance: Normal appearance.   HENT:      Head: Normocephalic and atraumatic.   Eyes:      General: Lids are normal.      Extraocular Movements: Extraocular movements intact.      Conjunctiva/sclera: Conjunctivae normal.   Cardiovascular:      Rate and Rhythm: Normal rate and regular rhythm.      Pulses: Normal pulses.      Heart sounds: Normal heart sounds.   Pulmonary:      Effort: Pulmonary effort is normal.      Breath sounds: Normal breath sounds.   Abdominal:      General: Bowel sounds are normal.      Palpations: Abdomen is soft.   Musculoskeletal:      Right lower leg: No edema.      Left lower leg: No edema.   Skin:     General: Skin is warm and dry.   Neurological:      General: No focal deficit present.      Mental Status: He is alert and oriented to person, place, and time.      Gait: Gait is intact.   Psychiatric:         Attention and Perception: Attention normal.         Mood and Affect: Mood normal.         Behavior: Behavior normal. Behavior is cooperative.         Thought Content: " Thought content normal.         Cognition and Memory: Cognition normal.         Judgment: Judgment normal.        Assessment/Plan:     Diagnoses and all orders for this visit:    1. Hypogonadism in male (Primary)  -     Testosterone (Free & Total), LC / MS; Future  -     CBC w AUTO Differential; Future  -     Prolactin; Future  -     Testosterone Cypionate (DEPOTESTOTERONE CYPIONATE) 200 MG/ML injection; Inject 1 mL into the appropriate muscle as directed by prescriber Every 14 (Fourteen) Days.  Dispense: 1 mL; Refill: 0    Pt is doing well with therapy. Will repeat CBC, testosterone levels and prolactin. Will also refill Testosterone medication. PDMP reviewed.     2. Therapeutic drug monitoring  -     Urine Drug Screen - Urine, Clean Catch; Future    3. Myalgia  -     meloxicam (MOBIC) 15 MG tablet; Take 1 tablet by mouth Daily As Needed for Mild Pain or Moderate Pain.  Dispense: 30 tablet; Refill: 0    4. Prediabetes  -     metFORMIN ER (GLUCOPHAGE-XR) 500 MG 24 hr tablet; Take 1 tablet by mouth Daily With Breakfast.  Dispense: 30 tablet; Refill: 0        Rx changes: see a/p  Patient Education: see a/p  Compliance at present is estimated to be good.       Depression screening: Up to date; last screen      Follow-up:     Return in about 4 weeks (around 8/18/2023) for Recheck.    Preventative:  Health Maintenance   Topic Date Due    COVID-19 Vaccine (1) Never done    ANNUAL PHYSICAL  Never done    INFLUENZA VACCINE  10/01/2023    TDAP/TD VACCINES (2 - Tdap) 01/06/2032    HEPATITIS C SCREENING  Completed    Pneumococcal Vaccine 0-64  Aged Out         Alcohol use:  reports current alcohol use of about 4.0 standard drinks per week.  Nicotine status  reports that he has never smoked. He has never used smokeless tobacco.     Goals        Other      Improvement in depressive symptoms  Barrier: None              RISK SCORE: 3            This document has been electronically signed by Dorina Zamora MD on July 25, 2023 13:36  CDT

## 2023-07-24 DIAGNOSIS — J30.2 SEASONAL ALLERGIES: ICD-10-CM

## 2023-07-24 RX ORDER — FEXOFENADINE HCL 180 MG/1
180 TABLET ORAL DAILY
Qty: 30 TABLET | Refills: 3 | Status: SHIPPED | OUTPATIENT
Start: 2023-07-24

## 2023-07-24 RX ORDER — FEXOFENADINE HCL 180 MG/1
180 TABLET ORAL DAILY
Qty: 30 TABLET | Refills: 3 | Status: SHIPPED | OUTPATIENT
Start: 2023-07-24 | End: 2023-07-24 | Stop reason: SDUPTHER

## 2023-07-26 DIAGNOSIS — F32.9 MAJOR DEPRESSIVE DISORDER WITH CURRENT ACTIVE EPISODE, UNSPECIFIED DEPRESSION EPISODE SEVERITY, UNSPECIFIED WHETHER RECURRENT: ICD-10-CM

## 2023-07-26 RX ORDER — DULOXETIN HYDROCHLORIDE 30 MG/1
30 CAPSULE, DELAYED RELEASE ORAL DAILY
Qty: 30 CAPSULE | Refills: 1 | Status: SHIPPED | OUTPATIENT
Start: 2023-07-26

## 2023-07-28 ENCOUNTER — LAB (OUTPATIENT)
Dept: LAB | Facility: HOSPITAL | Age: 28
End: 2023-07-28
Payer: COMMERCIAL

## 2023-07-28 ENCOUNTER — DOCUMENTATION (OUTPATIENT)
Dept: FAMILY MEDICINE CLINIC | Facility: CLINIC | Age: 28
End: 2023-07-28
Payer: COMMERCIAL

## 2023-07-28 DIAGNOSIS — E29.1 HYPOGONADISM IN MALE: ICD-10-CM

## 2023-07-28 PROCEDURE — 36415 COLL VENOUS BLD VENIPUNCTURE: CPT

## 2023-07-28 PROCEDURE — 84402 ASSAY OF FREE TESTOSTERONE: CPT

## 2023-07-28 PROCEDURE — 84403 ASSAY OF TOTAL TESTOSTERONE: CPT

## 2023-07-31 NOTE — PROGRESS NOTES
I have seen the patient.  I have reviewed the notes, assessments, and/or procedures performed by Dr. Zamora, I concur with her/his documentation and assessment and plan for Ricki Andrade.       This document has been electronically signed by Ever Aj MD on July 31, 2023 14:21 CDT

## 2023-08-05 LAB
TESTOST FREE SERPL-MCNC: 33.2 PG/ML (ref 9.3–26.5)
TESTOST SERPL-MCNC: 294.5 NG/DL (ref 264–916)

## 2023-08-07 ENCOUNTER — TELEPHONE (OUTPATIENT)
Dept: FAMILY MEDICINE CLINIC | Facility: CLINIC | Age: 28
End: 2023-08-07
Payer: COMMERCIAL

## 2023-08-07 NOTE — TELEPHONE ENCOUNTER
Called patient and verified identity x2.  Discussed results of recent testosterone testing and answered all questions.

## 2023-08-25 ENCOUNTER — OFFICE VISIT (OUTPATIENT)
Dept: FAMILY MEDICINE CLINIC | Facility: CLINIC | Age: 28
End: 2023-08-25
Payer: COMMERCIAL

## 2023-08-25 VITALS
WEIGHT: 206.1 LBS | DIASTOLIC BLOOD PRESSURE: 72 MMHG | HEIGHT: 72 IN | SYSTOLIC BLOOD PRESSURE: 126 MMHG | TEMPERATURE: 96.8 F | BODY MASS INDEX: 27.92 KG/M2 | HEART RATE: 106 BPM | OXYGEN SATURATION: 96 %

## 2023-08-25 DIAGNOSIS — F32.9 MAJOR DEPRESSIVE DISORDER WITH CURRENT ACTIVE EPISODE, UNSPECIFIED DEPRESSION EPISODE SEVERITY, UNSPECIFIED WHETHER RECURRENT: Primary | ICD-10-CM

## 2023-08-25 RX ORDER — VILAZODONE HYDROCHLORIDE 10 MG/1
10 TABLET ORAL DAILY
Qty: 30 TABLET | Refills: 0 | Status: SHIPPED | OUTPATIENT
Start: 2023-08-25

## 2023-08-28 NOTE — PROGRESS NOTES
Family Medicine Residency  Dorina Zamora MD    Subjective:     Ricki Andrade is a 28 y.o. male who presents for follow up. Patient reports he is experiencing with erection. He can get erect but it does not last long. This occurs every other time. It is causing some stress in his relationship. His GF just moved in with him.     The following portions of the patient's history were reviewed and updated as appropriate: allergies, current medications, past family history, past medical history, past social history, past surgical history, and problem list.    Past Medical Hx:  No past medical history on file.    Past Surgical Hx:  No past surgical history on file.    Current Meds:    Current Outpatient Medications:     buPROPion XL (WELLBUTRIN XL) 300 MG 24 hr tablet, Take 1 tablet by mouth Every Morning., Disp: 30 tablet, Rfl: 1    fexofenadine (ALLEGRA) 180 MG tablet, Take 1 tablet by mouth Daily., Disp: 30 tablet, Rfl: 3    levothyroxine (SYNTHROID, LEVOTHROID) 50 MCG tablet, Take 1 tablet by mouth Every Morning., Disp: 90 tablet, Rfl: 0    meloxicam (MOBIC) 15 MG tablet, Take 1 tablet by mouth Daily As Needed for Mild Pain or Moderate Pain., Disp: 30 tablet, Rfl: 0    metFORMIN ER (GLUCOPHAGE-XR) 500 MG 24 hr tablet, Take 1 tablet by mouth Daily With Breakfast., Disp: 30 tablet, Rfl: 0    Testosterone Cypionate (DEPOTESTOTERONE CYPIONATE) 200 MG/ML injection, Inject 1 mL into the appropriate muscle as directed by prescriber Every 14 (Fourteen) Days., Disp: 1 mL, Rfl: 0    vilazodone (Viibryd) 10 MG tablet tablet, Take 1 tablet by mouth Daily., Disp: 30 tablet, Rfl: 0    Allergies:  Allergies   Allergen Reactions    Cephalexin Unknown - High Severity       Family Hx:  No family history on file.     Social History:  Social History     Socioeconomic History    Marital status: Single   Tobacco Use    Smoking status: Never    Smokeless tobacco: Never   Substance and Sexual Activity    Alcohol use: Yes      "Alcohol/week: 4.0 standard drinks     Types: 2 Cans of beer, 1 Shots of liquor, 1 Glasses of wine per week    Drug use: Never       Review of Systems  Review of Systems   Constitutional:  Negative for appetite change, fatigue and fever.   HENT:  Negative for ear pain, sneezing, sore throat and trouble swallowing.    Respiratory:  Negative for cough, chest tightness and shortness of breath.    Cardiovascular:  Negative for chest pain.   Gastrointestinal:  Negative for abdominal pain, constipation, diarrhea, nausea and vomiting.   Genitourinary:  Negative for difficulty urinating.   Musculoskeletal:  Negative for back pain and myalgias.   Neurological:  Negative for headaches.     Objective:     /72   Pulse 106   Temp 96.8 øF (36 øC)   Ht 182.9 cm (72\")   Wt 93.5 kg (206 lb 1.6 oz)   SpO2 96%   BMI 27.95 kg/mý   Physical Exam  Vitals reviewed.   Constitutional:       Appearance: Normal appearance.   HENT:      Head: Normocephalic and atraumatic.   Eyes:      General: Lids are normal.      Extraocular Movements: Extraocular movements intact.      Conjunctiva/sclera: Conjunctivae normal.   Cardiovascular:      Rate and Rhythm: Normal rate and regular rhythm.      Pulses: Normal pulses.      Heart sounds: Normal heart sounds.   Pulmonary:      Effort: Pulmonary effort is normal.      Breath sounds: Normal breath sounds.   Abdominal:      General: Bowel sounds are normal.      Palpations: Abdomen is soft.   Musculoskeletal:      Right lower leg: No edema.      Left lower leg: No edema.   Skin:     General: Skin is warm and dry.   Neurological:      General: No focal deficit present.      Mental Status: He is alert and oriented to person, place, and time.      Gait: Gait is intact.   Psychiatric:         Attention and Perception: Attention normal.         Mood and Affect: Mood normal.         Behavior: Behavior normal. Behavior is cooperative.         Thought Content: Thought content normal.         Cognition " and Memory: Cognition normal.         Judgment: Judgment normal.        Assessment/Plan:     Diagnoses and all orders for this visit:    1. Major depressive disorder with current active episode, unspecified depression episode severity, unspecified whether recurrent (Primary)  -     vilazodone (Viibryd) 10 MG tablet tablet; Take 1 tablet by mouth Daily.  Dispense: 30 tablet; Refill: 0      Patient presents for a follow up and reports he can have long erections. He was on Cymbalta which has some sexual side effects. Discussed with patient to discontinue Cymbalta and start Viibryd due to viibryd having less sexual side effects.     His recent lab showed free testosterone elevated. Normal hct and hgb.   If testosterone continues to rise then consider decreasing the dose to 150.         Rx changes: see a/p  Patient Education: see a/p  Compliance at present is estimated to be good.       Depression screening: Up to date; last screen      Follow-up:     Return in about 6 weeks (around 10/6/2023) for Recheck.    Preventative:  Health Maintenance   Topic Date Due    COVID-19 Vaccine (1) Never done    ANNUAL PHYSICAL  Never done    INFLUENZA VACCINE  10/01/2023    TDAP/TD VACCINES (2 - Tdap) 01/06/2032    HEPATITIS C SCREENING  Completed    Pneumococcal Vaccine 0-64  Aged Out         Alcohol use:  reports current alcohol use of about 4.0 standard drinks per week.  Nicotine status  reports that he has never smoked. He has never used smokeless tobacco.     Goals        Other      Improvement in depressive symptoms  Barrier: None              RISK SCORE: 3            This document has been electronically signed by Dorina Zamora MD on August 28, 2023 14:56 CDT

## 2023-08-29 NOTE — PROGRESS NOTES
I was present on site during entire visit, have seen patient, reviewed the notes, assessments, and/or procedures performed by Dorina Zamora MD , I concur with her/his documentation of Ricki Andrade.

## 2023-09-05 ENCOUNTER — DOCUMENTATION (OUTPATIENT)
Dept: FAMILY MEDICINE CLINIC | Facility: CLINIC | Age: 28
End: 2023-09-05
Payer: COMMERCIAL

## 2023-09-05 DIAGNOSIS — E03.9 HYPOTHYROIDISM, UNSPECIFIED TYPE: ICD-10-CM

## 2023-09-05 DIAGNOSIS — E29.1 HYPOGONADISM IN MALE: Primary | ICD-10-CM

## 2023-09-05 DIAGNOSIS — N52.8 OTHER MALE ERECTILE DYSFUNCTION: ICD-10-CM

## 2023-09-05 RX ORDER — SILDENAFIL 50 MG/1
50 TABLET, FILM COATED ORAL DAILY PRN
Qty: 30 TABLET | Refills: 0 | Status: SHIPPED | OUTPATIENT
Start: 2023-09-05

## 2023-09-05 NOTE — PROGRESS NOTES
Pt sent a message that he is still having erectile dysfunction and pharmacy is not filling his Vibryd due to insurance issues. Called pharmacy and they need a PA.     Will get another free and total testosterone level and repeat TSH level.   And start patient on Sildenafil 50mg for 1 month.             This document has been electronically signed by Dorina Zamora MD on September 5, 2023 17:04 CDT

## 2023-09-08 ENCOUNTER — LAB (OUTPATIENT)
Dept: LAB | Facility: HOSPITAL | Age: 28
End: 2023-09-08
Payer: COMMERCIAL

## 2023-09-08 DIAGNOSIS — N52.8 OTHER MALE ERECTILE DYSFUNCTION: ICD-10-CM

## 2023-09-08 DIAGNOSIS — E29.1 HYPOGONADISM IN MALE: ICD-10-CM

## 2023-09-08 DIAGNOSIS — E03.9 HYPOTHYROIDISM, UNSPECIFIED TYPE: ICD-10-CM

## 2023-09-08 LAB
T4 FREE SERPL-MCNC: 1.03 NG/DL (ref 0.93–1.7)
TSH SERPL DL<=0.05 MIU/L-ACNC: 5 UIU/ML (ref 0.27–4.2)

## 2023-09-08 PROCEDURE — 84443 ASSAY THYROID STIM HORMONE: CPT

## 2023-09-08 PROCEDURE — 84403 ASSAY OF TOTAL TESTOSTERONE: CPT

## 2023-09-08 PROCEDURE — 84439 ASSAY OF FREE THYROXINE: CPT

## 2023-09-08 PROCEDURE — 36415 COLL VENOUS BLD VENIPUNCTURE: CPT

## 2023-09-08 PROCEDURE — 84402 ASSAY OF FREE TESTOSTERONE: CPT

## 2023-09-16 LAB
TESTOST FREE SERPL-MCNC: 29.5 PG/ML (ref 9.3–26.5)
TESTOST SERPL-MCNC: 247.4 NG/DL (ref 264–916)